# Patient Record
Sex: FEMALE | Race: WHITE | Employment: FULL TIME | URBAN - METROPOLITAN AREA
[De-identification: names, ages, dates, MRNs, and addresses within clinical notes are randomized per-mention and may not be internally consistent; named-entity substitution may affect disease eponyms.]

---

## 2018-04-23 ENCOUNTER — TELEPHONE (OUTPATIENT)
Dept: OTHER | Facility: CLINIC | Age: 43
End: 2018-04-23

## 2018-05-01 NOTE — PROGRESS NOTES
"   SUBJECTIVE:   CC: Nga Vega is an 42 year old woman who presents for preventive health visit.     Healthy Habits:    Do you get at least three servings of calcium containing foods daily (dairy, green leafy vegetables, etc.)? {YES/NO, DAIRY INTAKE:293653::\"yes\"}    Amount of exercise or daily activities, outside of work: {AMOUNT EXERCISE:652194}    Problems taking medications regularly {Yes /No default:581961::\"No\"}    Medication side effects: {Yes /No default.:413951::\"No\"}    Have you had an eye exam in the past two years? {YESNOBLANK:660142}    Do you see a dentist twice per year? {YESNOBLANK:755467}    Do you have sleep apnea, excessive snoring or daytime drowsiness?{YESNOBLANK:294052}  {Outside tests to abstract? :707853}    {additional problems to add (Optional):703813}    Today's PHQ-2 Score: No flowsheet data found.  {PHQ-2 LOOK IN ASSESSMENTS (Optional) :218353}  Abuse: Current or Past(Physical, Sexual or Emotional)- {YES/NO/NA:940799}  Do you feel safe in your environment - {YES/NO/NA:633266}    Social History   Substance Use Topics     Smoking status: Never Smoker     Smokeless tobacco: Not on file     Alcohol use Yes      Comment: 1-2/week     If you drink alcohol do you typically have >3 drinks per day or >7 drinks per week? {ETOH :354843}                     Reviewed orders with patient.  Reviewed health maintenance and updated orders accordingly - {Yes/No:692151::\"Yes\"}  {Chronicprobdata (Optional):223205}    {Mammo Decision Support (Optional):920994}    Pertinent mammograms are reviewed under the imaging tab.  History of abnormal Pap smear: {PAP HX:255653}    Reviewed and updated as needed this visit by clinical staff         Reviewed and updated as needed this visit by Provider        {HISTORY OPTIONS (Optional):696598}    ROS:  {FEMALE PREVENTATIVE ROS:346012}    OBJECTIVE:   There were no vitals taken for this visit.  EXAM:  {Exam Choices:681277}    ASSESSMENT/PLAN:   {Diag " "Picklist:150698}    COUNSELING:   {FEMALE COUNSELING MESSAGES:642002::\"Reviewed preventive health counseling, as reflected in patient instructions\"}    {BP Counseling- Complete if BP >= 120/80  (Optional):908291}     reports that she has never smoked. She does not have any smokeless tobacco history on file.  {Tobacco Cessation -- Complete if patient is a smoker (Optional):722752}  Estimated body mass index is 30.83 kg/(m^2) as calculated from the following:    Height as of 8/16/16: 5' 10\" (1.778 m).    Weight as of 8/16/16: 214 lb 14.4 oz (97.5 kg).   {Weight Management Plan (ACO) Complete if BMI is abnormal-  Ages 18-64  BMI >24.9.  Age 65+ with BMI <23 or >30 (Optional):957832}    Counseling Resources:  ATP IV Guidelines  Pooled Cohorts Equation Calculator  Breast Cancer Risk Calculator  FRAX Risk Assessment  ICSI Preventive Guidelines  Dietary Guidelines for Americans, 2010  USDA's MyPlate  ASA Prophylaxis  Lung CA Screening    Daxa Cochran MD  [facility]  "

## 2018-05-01 NOTE — PATIENT INSTRUCTIONS
Call your insurance to ask about mammogram coverage and if 3D mammogram is covered for screening.     Preventive Health Recommendations  Female Ages 40 to 49    Yearly exam:     See your health care provider every year in order to  1. Review health changes.   2. Discuss preventive care.    3. Review your medicines if your doctor prescribed any.      Get a Pap test every three years (unless you have an abnormal result and your provider advises testing more often).      If you get Pap tests with HPV test, you only need to test every 5 years, unless you have an abnormal result. You do not need a Pap test if your uterus was removed (hysterectomy) and you have not had cancer.      You should be tested each year for STDs (sexually transmitted diseases), if you're at risk.       Ask your doctor if you should have a mammogram.      Have a colonoscopy (test for colon cancer) if someone in your family has had colon cancer or polyps before age 50.       Have a cholesterol test every 5 years.       Have a diabetes test (fasting glucose) after age 45. If you are at risk for diabetes, you should have this test every 3 years.    Shots: Get a flu shot each year. Get a tetanus shot every 10 years.     Nutrition:     Eat at least 5 servings of fruits and vegetables each day.    Eat whole-grain bread, whole-wheat pasta and brown rice instead of white grains and rice.    Talk to your provider about Calcium and Vitamin D.     Lifestyle    Exercise at least 150 minutes a week (an average of 30 minutes a day, 5 days a week). This will help you control your weight and prevent disease.    Limit alcohol to one drink per day.    No smoking.     Wear sunscreen to prevent skin cancer.    See your dentist every six months for an exam and cleaning.

## 2018-05-07 ENCOUNTER — OFFICE VISIT (OUTPATIENT)
Dept: FAMILY MEDICINE | Facility: CLINIC | Age: 43
End: 2018-05-07
Payer: COMMERCIAL

## 2018-05-07 VITALS
RESPIRATION RATE: 16 BRPM | SYSTOLIC BLOOD PRESSURE: 126 MMHG | TEMPERATURE: 99 F | HEART RATE: 77 BPM | BODY MASS INDEX: 28.05 KG/M2 | DIASTOLIC BLOOD PRESSURE: 78 MMHG | WEIGHT: 189.4 LBS | HEIGHT: 69 IN

## 2018-05-07 DIAGNOSIS — A60.04 HERPES SIMPLEX VULVOVAGINITIS: ICD-10-CM

## 2018-05-07 DIAGNOSIS — Z00.00 ROUTINE GENERAL MEDICAL EXAMINATION AT A HEALTH CARE FACILITY: Primary | ICD-10-CM

## 2018-05-07 DIAGNOSIS — N91.2 AMENORRHEA: ICD-10-CM

## 2018-05-07 LAB — TSH SERPL DL<=0.005 MIU/L-ACNC: 1.58 MU/L (ref 0.4–4)

## 2018-05-07 PROCEDURE — 36415 COLL VENOUS BLD VENIPUNCTURE: CPT | Performed by: FAMILY MEDICINE

## 2018-05-07 PROCEDURE — G0145 SCR C/V CYTO,THINLAYER,RESCR: HCPCS | Performed by: FAMILY MEDICINE

## 2018-05-07 PROCEDURE — 84443 ASSAY THYROID STIM HORMONE: CPT | Performed by: FAMILY MEDICINE

## 2018-05-07 PROCEDURE — 87624 HPV HI-RISK TYP POOLED RSLT: CPT | Performed by: FAMILY MEDICINE

## 2018-05-07 PROCEDURE — 99386 PREV VISIT NEW AGE 40-64: CPT | Performed by: FAMILY MEDICINE

## 2018-05-07 RX ORDER — VALACYCLOVIR HYDROCHLORIDE 500 MG/1
500 TABLET, FILM COATED ORAL 2 TIMES DAILY
Qty: 30 TABLET | Refills: 3 | Status: SHIPPED | OUTPATIENT
Start: 2018-05-07 | End: 2019-10-14

## 2018-05-07 NOTE — LETTER
May 15, 2018    Nga Vega  [address]  [place] [address]    Dear Nga,  We are happy to inform you that your PAP smear result from 5/7/18 is normal.  We are now able to do a follow up test on PAP smears. The DNA test is for HPV (Human Papilloma Virus). Cervical cancer is closely linked with certain types of HPV. Your results showed no evidence of high risk HPV.  Therefore we recommend you return in 5 years for your next pap smear and HPV test.  You will still need to return to the clinic every year for an annual exam and other preventive tests.  Please contact the clinic at [phone] with any questions.  Sincerely,    Daxa Cochran MD/umer

## 2018-05-07 NOTE — MR AVS SNAPSHOT
After Visit Summary   5/7/2018    Nga Vega    MRN: [id number]           Patient Information     Date Of Birth          1975        Visit Information        Provider Department      5/7/2018 12:00 PM Daxa Cochran MD [facility]        Today's Diagnoses     Routine general medical examination at a health care facility    -  1    Herpes simplex vulvovaginitis        Amenorrhea          Care Instructions    Call your insurance to ask about mammogram coverage and if 3D mammogram is covered for screening.     Preventive Health Recommendations  Female Ages 40 to 49    Yearly exam:     See your health care provider every year in order to  1. Review health changes.   2. Discuss preventive care.    3. Review your medicines if your doctor prescribed any.      Get a Pap test every three years (unless you have an abnormal result and your provider advises testing more often).      If you get Pap tests with HPV test, you only need to test every 5 years, unless you have an abnormal result. You do not need a Pap test if your uterus was removed (hysterectomy) and you have not had cancer.      You should be tested each year for STDs (sexually transmitted diseases), if you're at risk.       Ask your doctor if you should have a mammogram.      Have a colonoscopy (test for colon cancer) if someone in your family has had colon cancer or polyps before age 50.       Have a cholesterol test every 5 years.       Have a diabetes test (fasting glucose) after age 45. If you are at risk for diabetes, you should have this test every 3 years.    Shots: Get a flu shot each year. Get a tetanus shot every 10 years.     Nutrition:     Eat at least 5 servings of fruits and vegetables each day.    Eat whole-grain bread, whole-wheat pasta and brown rice instead of white grains and rice.    Talk to your provider about Calcium and Vitamin D.     Lifestyle    Exercise at least 150 minutes a week (an average  "of 30 minutes a day, 5 days a week). This will help you control your weight and prevent disease.    Limit alcohol to one drink per day.    No smoking.     Wear sunscreen to prevent skin cancer.    See your dentist every six months for an exam and cleaning.          Follow-ups after your visit        Who to contact     If you have questions or need follow up information about today's clinic visit or your schedule please contact [facility] [place] directly at [phone].  Normal or non-critical lab and imaging results will be communicated to you by MyChart, letter or phone within 4 business days after the clinic has received the results. If you do not hear from us within 7 days, please contact the clinic through [employer] or phone. If you have a critical or abnormal lab result, we will notify you by phone as soon as possible.  Submit refill requests through "[employer] or call your pharmacy and they will forward the refill request to us. Please allow 3 business days for your refill to be completed.          Additional Information About Your Visit        "[employer] Information     "[employer] lets you send messages to your doctor, view your test results, renew your prescriptions, schedule appointments and more. To sign up, go to [email or web address] . Click on \"Log in\" on the left side of the screen, which will take you to the Welcome page. Then click on \"Sign up Now\" on the right side of the page.     You will be asked to enter the access code listed below, as well as some personal information. Please follow the directions to create your username and password.     Your access code is: [email or web address]  Expires: 2018  1:22 PM     Your access code will  in 90 days. If you need help or a new code, please call your [facility] or [phone].        Care EveryWhere ID     This is your Care EveryWhere ID. This could be used by other organizations to access your [place] medical records  [id number]        Your " "Vitals Were     Pulse Temperature Respirations Height Last Period BMI (Body Mass Index)    77 99  F (37.2  C) (Oral) 16 5' 9\" (1.753 m) 03/19/2018 27.97 kg/m2       Blood Pressure from Last 3 Encounters:   05/07/18 126/78   08/16/16 122/81    Weight from Last 3 Encounters:   05/07/18 189 lb 6.4 oz (85.9 kg)   08/16/16 214 lb 14.4 oz (97.5 kg)              Today, you had the following     No orders found for display       Primary Care Provider Office Phone # Fax #    Davy Fletcher [phone]       [place] [address] [address] [place] [facility] [address]        Equal Access to Services     CORNELL MACEDO : Hadii afshan cruzo Soomaali, waaxda luqadaha, qaybta kaalmada adeegyada, hermelinda burks . So [facility] [phone].    ATENCIÓN: Si habla español, tiene a baker disposición servicios gratuitos de asistencia lingüística. Llame al [phone].    We comply with applicable federal civil rights laws and [place] laws. We do not discriminate on the basis of race, color, national origin, age, disability, sex, sexual orientation, or gender identity.            Thank you!     Thank you for choosing [facility]  [facility]. Our goal is always to provide you with excellent care. Hearing back from our patients is one way we can continue to improve our services. Please take a few minutes to complete the written survey that you may receive in the mail after your visit with us. Thank you!             Your Updated Medication List - Protect others around you: Learn how to safely use, store and throw away your medicines at www.disposemymeds.org.          This list is accurate as of 5/7/18  1:22 PM.  Always use your most recent med list.                   Brand Name Dispense Instructions for use Diagnosis    ACYCLOVIR PO      Take 800 mg by mouth 2 times daily          "

## 2018-05-07 NOTE — LETTER
May 9, 2018    Nga Vega  [address] [address]            Dear Nga,    The results of your recent tests were normal.  Below is a copy of the results.  It was a pleasure to see you at your last appointment.    If you have any questions or concerns, please call myself or my nurse at [phone].    Sincerely,    Kristen Kehr, PA-C/laila    Results for orders placed or performed in visit on 05/07/18   TSH with free T4 reflex   Result Value Ref Range    TSH 1.58 0.40 - 4.00 mU/L

## 2018-05-07 NOTE — PROGRESS NOTES
SUBJECTIVE:   CC: Nga Vega is an 42 year old woman who presents for preventive health visit.     Physical   Annual:     Getting at least 3 servings of Calcium per day::  Yes    Bi-annual eye exam::  Yes    Dental care twice a year::  Yes    Sleep apnea or symptoms of sleep apnea::  Daytime drowsiness    Diet::  Regular (no restrictions)    Frequency of exercise::  2-3 days/week    Duration of exercise::  45-60 minutes    Taking medications regularly::  Yes    Medication side effects::  Not applicable    Additional concerns today::  YES            Irregular periods, has been tracking since June 2017. Ranging from 17-42 days in length. Before this time, periods were regular, occurring every 24-27 days and lasting 5 days. Patient with Esure x10 years since birth of youngest child. Patient endorsing bouts of night sweats and HA. Patient's mother with hx of early menopause.     Chronic Low Back Pain  -Initial XR of low back performed by chiropractor  -Patient manages pain with NSAIDs, exercise, ice, and heat packs  -Interested in re-imaging to evaluate for further degenerative changes    Hemorrhoids  -Since Nov/Dec 2017  -Well-managed with OTC rectal suppository; pruritis most bothersome sx  -Patient does not have problems with constipation    Genital Herpes  -x19 years; contracted from   -1-2 outbreaks annually; managed on acyclovir      Today's PHQ-2 Score:   PHQ-2 ( 1999 [employer]) 5/7/2018   Q1: Little interest or pleasure in doing things 0   Q2: Feeling down, depressed or hopeless 0   PHQ-2 Score 0   Q1: Little interest or pleasure in doing things Not at all   Q2: Feeling down, depressed or hopeless Not at all   PHQ-2 Score 0       Abuse: Current or Past(Physical, Sexual or Emotional)- No  Do you feel safe in your environment - Yes    Social History   Substance Use Topics     Smoking status: Never Smoker     Smokeless tobacco: Never Used     Alcohol use Yes      Comment: 1-2/week     Alcohol Use  5/7/2018   If you drink alcohol do you typically have greater than 3 drinks per day OR greater than 7 drinks per week? No   No flowsheet data found.    Reviewed orders with patient.  Reviewed health maintenance and updated orders accordingly - Yes  There is no problem list on file for this patient.    Past Surgical History:   Procedure Laterality Date     GYN SURGERY      4 vaginal deliveries     MAMMOPLASTY REDUCTION BILATERAL Bilateral 8/16/2016    Procedure: MAMMOPLASTY REDUCTION BILATERAL;  Surgeon: Andrew Craig MD;  Location: [facility]     ORTHOPEDIC SURGERY         Social History   Substance Use Topics     Smoking status: Never Smoker     Smokeless tobacco: Never Used     Alcohol use Yes      Comment: 1-2/week     Family History   Problem Relation Age of Onset     HEART DISEASE Mother      Hypertension Mother      DIABETES Father      Hypertension Father      Colon Cancer Maternal Grandmother          Current Outpatient Prescriptions   Medication Sig Dispense Refill     ACYCLOVIR PO Take 800 mg by mouth 2 times daily       valACYclovir (VALTREX) 500 MG tablet Take 1 tablet (500 mg) by mouth 2 times daily 30 tablet 3     Allergies   Allergen Reactions     Cats Itching       Mammogram not appropriate for this patient based on age.    Pertinent mammograms are reviewed under the imaging tab.  History of abnormal Pap smear: YES - colposcopy in 1995 that was negative, and normal PAP since this time    Reviewed and updated as needed this visit by clinical staff  Tobacco  Allergies  Meds  Problems  Med Hx  Surg Hx  Fam Hx  Soc Hx          Reviewed and updated as needed this visit by Provider  Allergies  Meds  Problems        Past Medical History:   Diagnosis Date     Allergic rhinitis      Chronic back pain      Genital herpes      Pap smear abnormality of vagina with ASC-US       Past Surgical History:   Procedure Laterality Date     GYN SURGERY      4 vaginal deliveries     MAMMOPLASTY REDUCTION  "BILATERAL Bilateral 2016    Procedure: MAMMOPLASTY REDUCTION BILATERAL;  Surgeon: Andrew Craig MD;  Location: [facility]     ORTHOPEDIC SURGERY       Obstetric History       T4      L4     SAB0   TAB0   Ectopic0   Multiple0   Live Births0       # Outcome Date GA Lbr Hussein/2nd Weight Sex Delivery Anes PTL Lv   4 Term            3 Term            2 Term            1 Term                   Review of Systems  CONSTITUTIONAL: NEGATIVE for fever, chills, change in weight  INTEGUMENTARU/SKIN: NEGATIVE for worrisome rashes, moles or lesions  EYES: NEGATIVE for vision changes or irritation  ENT: NEGATIVE for ear, mouth and throat problems  RESP: NEGATIVE for significant cough or SOB  BREAST: NEGATIVE for masses, tenderness or discharge  CV: NEGATIVE for chest pain, palpitations or peripheral edema  GI: NEGATIVE for nausea, abdominal pain, heartburn, or change in bowel habits  : NEGATIVE for unusual urinary or vaginal symptoms.   MUSCULOSKELETAL: POSITIVE for chronic low back myalgia  NEURO: NEGATIVE for weakness, dizziness or paresthesias  PSYCHIATRIC: NEGATIVE for changes in mood or affect     OBJECTIVE:   /78  Pulse 77  Temp 99  F (37.2  C) (Oral)  Resp 16  Ht 5' 9\" (1.753 m)  Wt 189 lb 6.4 oz (85.9 kg)  LMP 2018  BMI 27.97 kg/m2  Physical Exam  GENERAL: healthy, alert and no distress  EYES: Eyes grossly normal to inspection, PERRL and conjunctivae and sclerae normal  HENT: ear canals and TM's normal, nose and mouth without ulcers or lesions  NECK: no adenopathy, no asymmetry, masses, or scars and thyroid normal to palpation  RESP: lungs clear to auscultation - no rales, rhonchi or wheezes  BREAST: normal without masses, tenderness or nipple discharge and no palpable axillary masses or adenopathy. Bilateral keloid-type scarring bilateral inferior breasts, s/p bilateral breast reduction  CV: regular rate and rhythm, normal S1 S2, no S3 or S4, no murmur, click or rub, no peripheral " "edema and peripheral pulses strong  ABDOMEN: soft, nontender, no hepatosplenomegaly, no masses and bowel sounds normal   (female): normal female external genitalia, normal urethral meatus, vaginal mucosa pink, moist, well rugated, and normal cervix/adnexa/uterus without masses or discharge  MS: no gross musculoskeletal defects noted, no edema  SKIN: no suspicious lesions or rashes  NEURO: Normal strength and tone, mentation intact and speech normal  PSYCH: mentation appears normal, affect normal/bright    ASSESSMENT/PLAN:     (Z00.00) Routine general medical examination at a health care facility  (primary encounter diagnosis)  Comment: Discussed preventive healthcare recommendations  Plan: Pap imaged thin layer screen with HPV -         recommended age 30 - 65, HPV High Risk Types         DNA Cervical  Ok to go to q5 yr pap if nil/neg     (A60.04) Herpes simplex vulvovaginitis  Comment: Stable   Plan: valACYclovir (VALTREX) 500 MG tablet    (N91.2) Amenorrhea  Comment: Irregular menstrual patterns since June 2017 with vasomotor sx  Plan: TSH with free T4 reflex  Possible early menopause, + fam hx in mom       COUNSELING:  Reviewed preventive health counseling, as reflected in patient instructions       (Gretchen)menopause management       Mammogram recommendations     reports that she has never smoked. She has never used smokeless tobacco.    Estimated body mass index is 27.97 kg/(m^2) as calculated from the following:    Height as of this encounter: 5' 9\" (1.753 m).    Weight as of this encounter: 189 lb 6.4 oz (85.9 kg).       Counseling Resources:  ATP IV Guidelines  Pooled Cohorts Equation Calculator  Breast Cancer Risk Calculator  FRAX Risk Assessment  ICSI Preventive Guidelines  Dietary Guidelines for Americans, 2010  USDA's MyPlate  ASA Prophylaxis  Lung CA Screening    Daxa Cochran MD  [facility] [place]  Answers for HPI/ROS submitted by the patient on 5/7/2018   PHQ-2 Score: 0    "

## 2018-05-07 NOTE — NURSING NOTE
"Chief Complaint   Patient presents with     Physical     Health Maintenance     orders pended, tdap       Initial /78  Pulse 77  Temp 99  F (37.2  C) (Oral)  Resp 16  Ht 5' 9\" (1.753 m)  Wt 189 lb 6.4 oz (85.9 kg)  LMP 03/19/2018  BMI 27.97 kg/m2 Estimated body mass index is 27.97 kg/(m^2) as calculated from the following:    Height as of this encounter: 5' 9\" (1.753 m).    Weight as of this encounter: 189 lb 6.4 oz (85.9 kg).  Medication Reconciliation: complete  Merrill Finnegan CMA    "

## 2018-05-10 LAB
COPATH REPORT: NORMAL
PAP: NORMAL

## 2018-05-14 LAB
FINAL DIAGNOSIS: NORMAL
HPV HR 12 DNA CVX QL NAA+PROBE: NEGATIVE
HPV16 DNA SPEC QL NAA+PROBE: NEGATIVE
HPV18 DNA SPEC QL NAA+PROBE: NEGATIVE
SPECIMEN DESCRIPTION: NORMAL
SPECIMEN SOURCE CVX/VAG CYTO: NORMAL

## 2018-05-24 ENCOUNTER — OFFICE VISIT (OUTPATIENT)
Dept: FAMILY MEDICINE | Facility: CLINIC | Age: 43
End: 2018-05-24
Payer: COMMERCIAL

## 2018-05-24 VITALS
WEIGHT: 189 LBS | BODY MASS INDEX: 27.91 KG/M2 | DIASTOLIC BLOOD PRESSURE: 77 MMHG | SYSTOLIC BLOOD PRESSURE: 120 MMHG | TEMPERATURE: 98.8 F | HEART RATE: 71 BPM | OXYGEN SATURATION: 99 % | RESPIRATION RATE: 16 BRPM

## 2018-05-24 DIAGNOSIS — J01.00 ACUTE MAXILLARY SINUSITIS, RECURRENCE NOT SPECIFIED: Primary | ICD-10-CM

## 2018-05-24 DIAGNOSIS — R09.81 NASAL CONGESTION: ICD-10-CM

## 2018-05-24 PROCEDURE — 99213 OFFICE O/P EST LOW 20 MIN: CPT | Performed by: INTERNAL MEDICINE

## 2018-05-24 RX ORDER — FLUTICASONE PROPIONATE 50 MCG
2 SPRAY, SUSPENSION (ML) NASAL DAILY
Qty: 1 BOTTLE | Refills: 0 | Status: SHIPPED | OUTPATIENT
Start: 2018-05-24

## 2018-05-24 ASSESSMENT — PAIN SCALES - GENERAL: PAINLEVEL: MODERATE PAIN (4)

## 2018-05-24 NOTE — PROGRESS NOTES
SUBJECTIVE:  Nga Vega is an 42 year old female who presents for not feeling well.  Feels like has a sinus infection.  About 11 days ago started getting some nasal congestion. Has worsened and has sinus pressure, more on left than right side.  No fevers, chills, sweats.  Has a lot of congestion and nasal drainage.  Teeth on left upper mouth started to hurt yesterday.  Cough from the drainage going down throat.  Left ear hurts some, feels plugged.  No recent travel.  No known exposures.  Took some dayquil and nyquil which helped at first, but not help much now.         has a past medical history of Allergic rhinitis; Chronic back pain; Genital herpes; and Pap smear abnormality of vagina with ASC-US.  Social History     Social History     Marital status:      Spouse name: N/A     Number of children: N/A     Years of education: N/A     Social History Main Topics     Smoking status: Never Smoker     Smokeless tobacco: Never Used     Alcohol use Yes      Comment: 1-2/week     Drug use: No     Sexual activity: Not Asked     Other Topics Concern     None     Social History Narrative     Family History   Problem Relation Age of Onset     HEART DISEASE Mother      Hypertension Mother      DIABETES Father      Hypertension Father      Colon Cancer Maternal Grandmother        ALLERGIES:  Cats    Current Outpatient Prescriptions   Medication     ACYCLOVIR PO     valACYclovir (VALTREX) 500 MG tablet     No current facility-administered medications for this visit.          ROS:  ROS is done and is negative for general, constitutional, eye, ENT, Respiratory, cardiovascular, GI, , Skin, musculoskeletal except as noted elsewhere.      OBJECTIVE:  /77  Pulse 71  Temp 98.8  F (37.1  C) (Oral)  Resp 16  Wt 189 lb (85.7 kg)  SpO2 99%  Breastfeeding? No  BMI 27.91 kg/m2  GENERAL APPEARANCE: Alert, in no acute distress  EYES: normal  EARS: External ears normal. Canals clear. TMs with mild clear effusions, no  erythema.  NOSE:mild yellow discharge and mildly inflamed mucosa  OROPHARYNX:normal  SINUSES: tender over left maxillary sinus.  NECK:No adenopathy,masses or thyromegaly  RESP: normal and clear to auscultation  CV:regular rate and rhythm and no murmurs, clicks, or gallops  ABDOMEN: Abdomen soft, non-tender. BS normal. No masses, organomegaly  SKIN: no ulcers, lesions or rash  MUSCULOSKELETAL:Musculoskeletal normal      RESULTS  .  No results found for this or any previous visit (from the past 48 hour(s)).    ASSESSMENT/PLAN:    ASSESSMENT / PLAN:  (J01.00) Acute maxillary sinusitis, recurrence not specified  (primary encounter diagnosis)  Comment:   Plan: amoxicillin-clavulanate (AUGMENTIN) 875-125 MG         per tablet        Reviewed medication instructions and side effects. Follow up if experiences side effects. I reviewed supportive care, expected course, and signs of concern.  Follow up as needed or if she does not improve within 1 week(s) or if worsens in any way.  Reviewed red flag symptoms and is to go to the ER if experiences any of these.    (R09.81) Nasal congestion  Comment:   Plan: fluticasone (FLONASE) 50 MCG/ACT spray        Reviewed medication instructions and side effects. Follow up if experiences side effects.. I reviewed supportive care, expected course, and signs of concern.  Follow up as needed or if she does not improve within 2 week(s) or if worsens in any way.  Reviewed red flag symptoms and is to go to the ER if experiences any of these.      See [facility] for orders, medications, letters, patient instructions    Gerda Estrada M.D.

## 2018-05-24 NOTE — MR AVS SNAPSHOT
"              After Visit Summary   5/24/2018    Nga Vega    MRN: [id number]           Patient Information     Date Of Birth          1975        Visit Information        Provider Department      5/24/2018 3:20 PM Gerda Estrada MD [facility]        Today's Diagnoses     Acute maxillary sinusitis, recurrence not specified    -  1    Nasal congestion           Follow-ups after your visit        Follow-up notes from your care team     Return in about 1 week (around 5/31/2018), or if symptoms worsen or fail to improve, for follow up with primary doctor.      Who to contact     If you have questions or need follow up information about today's clinic visit or your schedule please contact [facility] directly at [phone].  Normal or non-critical lab and imaging results will be communicated to you by MyChart, letter or phone within 4 business days after the clinic has received the results. If you do not hear from us within 7 days, please contact the clinic through "[employer]hart or phone. If you have a critical or abnormal lab result, we will notify you by phone as soon as possible.  Submit refill requests through [employer] or call your pharmacy and they will forward the refill request to us. Please allow 3 business days for your refill to be completed.          Additional Information About Your Visit        "[employer] Information     [employer] lets you send messages to your doctor, view your test results, renew your prescriptions, schedule appointments and more. To sign up, go to [email or web address] . Click on \"Log in\" on the left side of the screen, which will take you to the Welcome page. Then click on \"Sign up Now\" on the right side of the page.     You will be asked to enter the access code listed below, as well as some personal information. Please follow the directions to create your username and password.     Your access code is: [email or web address]  Expires: 8/5/2018  1:22 PM   "   Your access code will  in 90 days. If you need help or a new code, please call your [place] clinic or [phone].        Care EveryWhere ID     This is your Care EveryWhere ID. This could be used by other organizations to access your [place] medical records  [id number]        Your Vitals Were     Pulse Temperature Respirations Pulse Oximetry Breastfeeding? BMI (Body Mass Index)    71 98.8  F (37.1  C) (Oral) 16 99% No 27.91 kg/m2       Blood Pressure from Last 3 Encounters:   18 120/77   18 126/78   16 122/81    Weight from Last 3 Encounters:   18 189 lb (85.7 kg)   18 189 lb 6.4 oz (85.9 kg)   16 214 lb 14.4 oz (97.5 kg)              Today, you had the following     No orders found for display         Today's Medication Changes          These changes are accurate as of 18  4:35 PM.  If you have any questions, ask your nurse or doctor.               Start taking these medicines.        Dose/Directions    amoxicillin-clavulanate 875-125 MG per tablet   Commonly known as:  AUGMENTIN   Used for:  Acute maxillary sinusitis, recurrence not specified   Started by:  Gerda Estrada MD        Dose:  1 tablet   Take 1 tablet by mouth 2 times daily   Quantity:  20 tablet   Refills:  0       fluticasone 50 MCG/ACT spray   Commonly known as:  FLONASE   Used for:  Nasal congestion   Started by:  Gerda Estrada MD        Dose:  2 spray   Spray 2 sprays into both nostrils daily   Quantity:  1 Bottle   Refills:  0            Where to get your medicines      These medications were sent to [facility] [address], [address]  [address] [address] [facility] [address] [address]     Phone:  [phone]     amoxicillin-clavulanate 875-125 MG per tablet    fluticasone 50 MCG/ACT spray                Primary Care Provider Office Phone # Fax #    Davy Fletcher [phone]       [facility] [facility] [address] [facility] [address] [facility]  [place] [address]        Equal Access to Services     [facility] Hospital[facility] : Hadii aad ku hadanijimbo [place], wanitzada jaylynglendaha, qaedwardna duheatherhemrelinda wiley. So [facility] [phone].    ATENCIÓN: Si habla español, tiene a baker disposición servicios gratuitos de asistencia lingüística. Llame al [phone].    We comply with applicable federal civil rights laws and [place] laws. We do not discriminate on the basis of race, color, national origin, age, disability, sex, sexual orientation, or gender identity.            Thank you!     Thank you for choosing [facility] [place]  [facility]. Our goal is always to provide you with excellent care. Hearing back from our patients is one way we can continue to improve our services. Please take a few minutes to complete the written survey that you may receive in the mail after your visit with us. Thank you!             Your Updated Medication List - Protect others around you: Learn how to safely use, store and throw away your medicines at www.disposemymeds.org.          This list is accurate as of 5/24/18  4:35 PM.  Always use your most recent med list.                   Brand Name Dispense Instructions for use Diagnosis    ACYCLOVIR PO      Take 800 mg by mouth 2 times daily        amoxicillin-clavulanate 875-125 MG per tablet    AUGMENTIN    20 tablet    Take 1 tablet by mouth 2 times daily    Acute maxillary sinusitis, recurrence not specified       fluticasone 50 MCG/ACT spray    FLONASE    1 Bottle    Spray 2 sprays into both nostrils daily    Nasal congestion       valACYclovir 500 MG tablet    VALTREX    30 tablet    Take 1 tablet (500 mg) by mouth 2 times daily    Herpes simplex vulvovaginitis

## 2018-05-24 NOTE — NURSING NOTE
"Chief Complaint   Patient presents with     Sinus Problem     c/o sinus pressure, drainage and left ear pain        Initial /77  Pulse 71  Temp 98.8  F (37.1  C) (Oral)  Resp 16  Wt 189 lb (85.7 kg)  SpO2 99%  Breastfeeding? No  BMI 27.91 kg/m2 Estimated body mass index is 27.91 kg/(m^2) as calculated from the following:    Height as of 5/7/18: 5' 9\" (1.753 m).    Weight as of this encounter: 189 lb (85.7 kg).  Medication Reconciliation: complete  Patient and/or MA were masked during rooming process  Catalina Pollard MA        "

## 2019-07-29 NOTE — PROGRESS NOTES
Nga is a 43 year old  female who presents for annual exam.     Besides routine health maintenance, she would like to discuss menopausal sx's.    HPI:   Pt has been skipping periods for 7 months this yr but then had a long one a couple weeks ago.  Has night sweats, less in the last couple weeks.   Doesn't want ocp or hrt she thinks. Has more headaches.     Her mom had early menopause.   Pt had normal thyroid test last yr but hasn't had fsh checked yet.     Would like to restart celexa due to stress with ongoing marital counseling.  She was on 40 mg in the past.    Pt has 4 kids, uses essure for contraception  Prior breast reduction, has some chronic itching bilateral since surgery.   No hx of abnormal pap smears               GYNECOLOGIC HISTORY:    Patient's last menstrual period was 07/10/2019.  Her current contraception method is: Essure.  She  reports that she has never smoked. She has never used smokeless tobacco.    Patient is sexually active.  STD testing offered?  Declined  Last PHQ-9 score on record =   PHQ-9 SCORE 2019   PHQ-9 Total Score 10     Last GAD7 score on record =   ACOSTA-7 SCORE 2019   Total Score 3     Alcohol Score = 2    HEALTH MAINTENANCE:  Cholesterol: TSH= 18 1.58    Last Mammo: Never, Result: not applicable, Next Mammo: needs to schedule   Pap:   Lab Results   Component Value Date    PAP NIL 2018 WNL HPV (-)neg  Colonoscopy:  NA, Result: not applicable, Next Colonoscopy: NA years.  Dexa:  NA    Health maintenance updated:  yes    HISTORY:  OB History    Para Term  AB Living   4 4 4 0 0 4   SAB TAB Ectopic Multiple Live Births   0 0 0 0 4      # Outcome Date GA Lbr Hussein/2nd Weight Sex Delivery Anes PTL Lv   4 Term         ROBYN   3 Term         ROBYN   2 Term         ROBYN   1 Term         ROBYN       Patient Active Problem List   Diagnosis     Cervical cancer screening     Past Surgical History:   Procedure Laterality Date     AS  "HYSTEROS W PERMANENT FALLOPAIN IMPLANT       GYN SURGERY      4 vaginal deliveries     MAMMOPLASTY REDUCTION BILATERAL Bilateral 8/16/2016    Procedure: MAMMOPLASTY REDUCTION BILATERAL;  Surgeon: Andrew Craig MD;  Location: [facility]     ORTHOPEDIC SURGERY        Social History     Tobacco Use     Smoking status: Never Smoker     Smokeless tobacco: Never Used   Substance Use Topics     Alcohol use: Yes     Comment: 1-2/week      Problem (# of Occurrences) Relation (Name,Age of Onset)    Colon Cancer (1) Maternal Grandmother    Diabetes (1) Father    Heart Disease (1) Mother    Hypertension (2) Mother, Father            Current Outpatient Medications   Medication Sig     ACYCLOVIR PO Take 800 mg by mouth 2 times daily     citalopram (CELEXA) 40 MG tablet Take 1 tablet (40 mg) by mouth daily     fluticasone (FLONASE) 50 MCG/ACT spray Spray 2 sprays into both nostrils daily     valACYclovir (VALTREX) 500 MG tablet Take 1 tablet (500 mg) by mouth 2 times daily     No current facility-administered medications for this visit.      Allergies   Allergen Reactions     Cats Itching       Past medical, surgical, social and family histories were reviewed and updated in EPIC.    ROS:   12 point review of systems negative other than symptoms noted below.  Constitutional: Fatigue and Weight Gain  Genitourinary: Hot Flashes, Irregular Menses, Night Sweats and Vaginal Discharge  Neurologic: Headaches  Psychiatric: Anxiety, Depression, Difficulty Sleeping and [place]    EXAM:  /62   Ht 1.778 m (5' 10\")   Wt 93 kg (205 lb)   LMP 07/10/2019   BMI 29.41 kg/m     BMI: Body mass index is 29.41 kg/m .    PHYSICAL EXAM:  Constitutional:  Appearance: Well nourished, well developed, alert, in no acute distress  Neck:  Lymph Nodes:  No lymphadenopathy present    Thyroid:  Gland size normal, nontender, no nodules or masses present  on palpation  Chest:  Respiratory Effort:  Breathing unlabored  Cardiovascular:    Heart: Auscultation: "  Regular rate, normal rhythm, no murmurs present  Breasts: Inspection of Breasts:  No lymphadenopathy present., Palpation of Breasts and Axillae:  No masses present on palpation, no breast tenderness., Axillary Lymph Nodes:  No lymphadenopathy present. and No nodularity, asymmetry or nipple discharge bilaterally.  Gastrointestinal:   Abdominal Examination:  Abdomen nontender to palpation, tone normal without rigidity or guarding, no masses present, umbilicus without lesions   Liver and Spleen:  No hepatomegaly present, liver nontender to palpation    Hernias:  No hernias present  Lymphatic: Lymph Nodes:  No other lymphadenopathy present  Skin:  General Inspection:  No rashes present, no lesions present, no areas of  discoloration    Genitalia and Groin:  No rashes present, no lesions present, no areas of  discoloration, no masses present  Neurologic/Psychiatric:    Mental Status:  Oriented X3     Pelvic Exam:  External Genitalia:     Normal appearance for age, no discharge present, no tenderness present, no inflammatory lesions present, color normal  Vagina:     Normal vaginal vault without central or paravaginal defects, no discharge present, no inflammatory lesions present, no masses present  Bladder:     Nontender to palpation  Urethra:   Urethral Body:  Urethra palpation normal, urethra structural support normal   Urethral Meatus:  No erythema or lesions present  Cervix:     Appearance healthy, no lesions present, nontender to palpation, no bleeding present  Uterus:     Uterus: firm, normal sized and nontender, midplane in position.   Adnexa:     No adnexal tenderness present, no adnexal masses present  Perineum:     Perineum within normal limits, no evidence of trauma, no rashes or skin lesions present  Anus:     Anus within normal limits, no hemorrhoids present  Inguinal Lymph Nodes:     No lymphadenopathy present  Pubic Hair:     Normal pubic hair distribution for age  Genitalia and Groin:     No rashes  present, no lesions present, no areas of discoloration, no masses present      COUNSELING:   Reviewed preventive health counseling, as reflected in patient instructions       (Gretchen)menopause management    BMI: Body mass index is 29.41 kg/m .  Weight management plan: Discussed healthy diet and exercise guidelines    ASSESSMENT:  43 year old female with satisfactory annual exam.    ICD-10-CM    1. Encounter for gynecological examination without abnormal finding Z01.419    2. Menopausal symptoms N95.1 Follicle stimulating hormone   3. Other depression F32.89 citalopram (CELEXA) 40 MG tablet       PLAN:  rx sent for celexa 40 mg daily for her to resume    Disc options of hrt or ocp for menopause sx control. She declines for now.   Disc typical patterns in menopause, expected lab results. Disc that FSH can go up and down for awhile so that fits with estrogen levels fluctuating.     Disc that most likely she is going to have menopause earlier than some people. We will check FSH today    Needs to schedule mammogram, hasn't had one yet    Disc taking Vit D 2000 international units daily now    Additional 20 min spent today on counceling and discussion of problems beyond preventive services.  More then 50% of visit.     Nikole Wen MD

## 2019-07-30 ENCOUNTER — OFFICE VISIT (OUTPATIENT)
Dept: OBGYN | Facility: CLINIC | Age: 44
End: 2019-07-30
Payer: COMMERCIAL

## 2019-07-30 VITALS
BODY MASS INDEX: 29.35 KG/M2 | DIASTOLIC BLOOD PRESSURE: 62 MMHG | WEIGHT: 205 LBS | SYSTOLIC BLOOD PRESSURE: 120 MMHG | HEIGHT: 70 IN

## 2019-07-30 DIAGNOSIS — N95.1 MENOPAUSAL SYMPTOMS: ICD-10-CM

## 2019-07-30 DIAGNOSIS — Z01.419 ENCOUNTER FOR GYNECOLOGICAL EXAMINATION WITHOUT ABNORMAL FINDING: Primary | ICD-10-CM

## 2019-07-30 DIAGNOSIS — F32.89 OTHER DEPRESSION: ICD-10-CM

## 2019-07-30 LAB — FSH SERPL-ACNC: 5.1 IU/L

## 2019-07-30 PROCEDURE — 99386 PREV VISIT NEW AGE 40-64: CPT | Performed by: OBSTETRICS & GYNECOLOGY

## 2019-07-30 PROCEDURE — 36415 COLL VENOUS BLD VENIPUNCTURE: CPT | Performed by: OBSTETRICS & GYNECOLOGY

## 2019-07-30 PROCEDURE — 83001 ASSAY OF GONADOTROPIN (FSH): CPT | Performed by: OBSTETRICS & GYNECOLOGY

## 2019-07-30 PROCEDURE — 99213 OFFICE O/P EST LOW 20 MIN: CPT | Mod: 25 | Performed by: OBSTETRICS & GYNECOLOGY

## 2019-07-30 RX ORDER — CITALOPRAM HYDROBROMIDE 40 MG/1
40 TABLET ORAL DAILY
Qty: 90 TABLET | Refills: 3 | Status: SHIPPED | OUTPATIENT
Start: 2019-07-30 | End: 2020-07-15

## 2019-07-30 ASSESSMENT — ANXIETY QUESTIONNAIRES
1. FEELING NERVOUS, ANXIOUS, OR ON EDGE: SEVERAL DAYS
2. NOT BEING ABLE TO STOP OR CONTROL WORRYING: NOT AT ALL
3. WORRYING TOO MUCH ABOUT DIFFERENT THINGS: NOT AT ALL
7. FEELING AFRAID AS IF SOMETHING AWFUL MIGHT HAPPEN: NOT AT ALL
GAD7 TOTAL SCORE: 3
IF YOU CHECKED OFF ANY PROBLEMS ON THIS QUESTIONNAIRE, HOW DIFFICULT HAVE THESE PROBLEMS MADE IT FOR YOU TO DO YOUR WORK, TAKE CARE OF THINGS AT HOME, OR GET ALONG WITH OTHER PEOPLE: NOT DIFFICULT AT ALL
6. BECOMING EASILY ANNOYED OR IRRITABLE: SEVERAL DAYS
5. BEING SO RESTLESS THAT IT IS HARD TO SIT STILL: NOT AT ALL

## 2019-07-30 ASSESSMENT — MIFFLIN-ST. JEOR: SCORE: 1670.12

## 2019-07-30 ASSESSMENT — PATIENT HEALTH QUESTIONNAIRE - PHQ9
SUM OF ALL RESPONSES TO PHQ QUESTIONS 1-9: 10
5. POOR APPETITE OR OVEREATING: SEVERAL DAYS

## 2019-07-31 ASSESSMENT — ANXIETY QUESTIONNAIRES: GAD7 TOTAL SCORE: 3

## 2019-10-14 DIAGNOSIS — A60.04 HERPES SIMPLEX VULVOVAGINITIS: ICD-10-CM

## 2019-10-14 NOTE — LETTER
October 16, 2019    Nga Vega  [address] [facility] [address] [address]    Dear Nga,       We recently received a refill request for valACYclovir (VALTREX) 500 MG tablet.  We have refilled this for a one time 30 day supply only because you are due for a:    Medication check office visit-it has been more that 1 year since you have had an appointment.      Please call at your earliest convenience so that there will not be a delay with your future refills.          Thank you,   Your [facility] Team/  [phone]

## 2019-10-16 RX ORDER — VALACYCLOVIR HYDROCHLORIDE 500 MG/1
TABLET, FILM COATED ORAL
Qty: 30 TABLET | Refills: 0 | Status: SHIPPED | OUTPATIENT
Start: 2019-10-16 | End: 2020-07-15

## 2019-10-16 NOTE — TELEPHONE ENCOUNTER
Medication is being filled for 1 time refill only due to:  Patient needs to be seen because it has been more than one year since last visit. needs cr lab.  Delia STEPHENSONN, RN

## 2019-11-06 ENCOUNTER — HEALTH MAINTENANCE LETTER (OUTPATIENT)
Age: 44
End: 2019-11-06

## 2020-06-24 ENCOUNTER — TELEPHONE (OUTPATIENT)
Dept: OBGYN | Facility: CLINIC | Age: 45
End: 2020-06-24

## 2020-06-24 DIAGNOSIS — N63.0 BREAST LUMP: Primary | ICD-10-CM

## 2020-06-24 NOTE — TELEPHONE ENCOUNTER
OV with Dr Wen 7/30/19-no mention of breast lump    Pt arrived for screening mammogram at the breast center today   Reports a breast lump she has had for years  Radiologist wants diagnostic testing done.  Pt at the breast center now.    [id number] orders placed per verbal order from Dr Wen.    Clarissa Jason, RN on 6/24/2020 at 2:06 PM

## 2020-07-13 ENCOUNTER — ANCILLARY PROCEDURE (OUTPATIENT)
Dept: ULTRASOUND IMAGING | Facility: CLINIC | Age: 45
End: 2020-07-13
Attending: OBSTETRICS & GYNECOLOGY
Payer: COMMERCIAL

## 2020-07-13 ENCOUNTER — ANCILLARY PROCEDURE (OUTPATIENT)
Dept: MAMMOGRAPHY | Facility: CLINIC | Age: 45
End: 2020-07-13
Attending: OBSTETRICS & GYNECOLOGY
Payer: COMMERCIAL

## 2020-07-13 DIAGNOSIS — N63.0 BREAST LUMP: ICD-10-CM

## 2020-07-13 PROCEDURE — G0279 TOMOSYNTHESIS, MAMMO: HCPCS | Performed by: STUDENT IN AN ORGANIZED HEALTH CARE EDUCATION/TRAINING PROGRAM

## 2020-07-13 PROCEDURE — 76882 US LMTD JT/FCL EVL NVASC XTR: CPT | Mod: RT | Performed by: STUDENT IN AN ORGANIZED HEALTH CARE EDUCATION/TRAINING PROGRAM

## 2020-07-13 PROCEDURE — 77066 DX MAMMO INCL CAD BI: CPT | Performed by: STUDENT IN AN ORGANIZED HEALTH CARE EDUCATION/TRAINING PROGRAM

## 2020-07-14 NOTE — PROGRESS NOTES
Nga is a 44 year old  female who presents for annual exam.     Besides routine health maintenance, she has no other health concerns today .    HPI:  The patient's PCP is  Davy Fletcher MD.   Patient does life insurance exams  Is using good covid precautions  Refill celexa and valtrex  No concerns         GYNECOLOGIC HISTORY:    Patient's last menstrual period was 2020.    Regular menses? no  Menses every 60 days.  Length of menses: 5 days    Her current contraception method is: none.  She  reports that she has never smoked. She has never used smokeless tobacco.    Patient is sexually active.  STD testing offered?  Declined  Last PHQ-9 score on record =   PHQ-9 SCORE 7/15/2020   PHQ-9 Total Score 4     Last GAD7 score on record =   ACOSTA-7 SCORE 7/15/2020   Total Score 0     Alcohol Score = 3    HEALTH MAINTENANCE:  Cholesterol: (No results found for: CHOL   Last Mammo: 2020, Result: Normal , Next Mammo:    Pap:   Lab Results   Component Value Date    PAP NIL HPV- 2018      Colonoscopy:  NEVER, Result: Not applicable, Next Colonoscopy: Due at age 51 years.  Dexa:  never    Health maintenance updated:  yes    HISTORY:  OB History    Para Term  AB Living   4 4 4 0 0 4   SAB TAB Ectopic Multiple Live Births   0 0 0 0 4      # Outcome Date GA Lbr Hussein/2nd Weight Sex Delivery Anes PTL Lv   4 Term         ROBYN   3 Term         ROBYN   2 Term         ROBYN   1 Term         ROBYN       Patient Active Problem List   Diagnosis     Cervical cancer screening     Past Surgical History:   Procedure Laterality Date     AS HYSTEROS W PERMANENT FALLOPAIN IMPLANT       GYN SURGERY      4 vaginal deliveries     MAMMOPLASTY REDUCTION BILATERAL Bilateral 2016    Procedure: MAMMOPLASTY REDUCTION BILATERAL;  Surgeon: Andrew Craig MD;  Location: [facility]     ORTHOPEDIC SURGERY        Social History     Tobacco Use     Smoking status: Never Smoker     Smokeless tobacco: Never  "Used   Substance Use Topics     Alcohol use: Yes     Frequency: 2-3 times a week     Drinks per session: 1 or 2     Binge frequency: Never     Comment: 1-2/week      Problem (# of Occurrences) Relation (Name,Age of Onset)    Colon Cancer (1) Maternal Grandmother    Diabetes (1) Father    Heart Disease (1) Mother    Hypertension (2) Mother, Father            Current Outpatient Medications   Medication Sig     citalopram (CELEXA) 40 MG tablet Take 1 tablet (40 mg) by mouth daily     valACYclovir (VALTREX) 500 MG tablet TAKE 1 TABLET(500 MG) BY MOUTH TWICE DAILY     ACYCLOVIR PO Take 800 mg by mouth 2 times daily     fluticasone (FLONASE) 50 MCG/ACT spray Spray 2 sprays into both nostrils daily (Patient not taking: Reported on 7/15/2020)     No current facility-administered medications for this visit.      Allergies   Allergen Reactions     Cats Itching       Past medical, surgical, social and family histories were reviewed and updated in EPIC.    ROS:   12 point review of systems negative other than symptoms noted below or in the HPI.  No urinary frequency or dysuria, bladder or kidney problems    EXAM:  /74   Pulse 68   Ht 1.778 m (5' 10\")   Wt 99.3 kg (219 lb)   LMP 07/06/2020   BMI 31.42 kg/m     BMI: Body mass index is 31.42 kg/m .    PHYSICAL EXAM:  Constitutional:   Appearance: Well nourished, well developed, alert, in no acute distress  Neck:  Lymph Nodes:  No lymphadenopathy present    Thyroid:  Gland size normal, nontender, no nodules or masses present  on palpation  Chest:  Respiratory Effort:  Breathing unlabored  Cardiovascular:    Heart: Auscultation:  Regular rate, normal rhythm, no murmurs present  Breasts: Inspection of Breasts:  No lymphadenopathy present., Palpation of Breasts and Axillae:  No masses present on palpation, no breast tenderness., Axillary Lymph Nodes:  No lymphadenopathy present. and No nodularity, asymmetry or nipple discharge bilaterally.  Gastrointestinal:   Abdominal " Examination:  Abdomen nontender to palpation, tone normal without rigidity or guarding, no masses present, umbilicus without lesions   Liver and Spleen:  No hepatomegaly present, liver nontender to palpation    Hernias:  No hernias present  Lymphatic: Lymph Nodes:  No other lymphadenopathy present  Skin:  General Inspection:  No rashes present, no lesions present, no areas of  discoloration  Neurologic:    Mental Status:  Oriented X3.  Normal strength and tone, sensory exam                grossly normal, mentation intact and speech normal.    Psychiatric:   Mentation appears normal and affect normal/bright.         Pelvic Exam:  External Genitalia:     Normal appearance for age, no discharge present, no tenderness present, no inflammatory lesions present, color normal  Vagina:     Normal vaginal vault without central or paravaginal defects, no discharge present, no inflammatory lesions present, no masses present  Bladder:     Nontender to palpation  Urethra:   Urethral Body:  Urethra palpation normal, urethra structural support normal   Urethral Meatus:  No erythema or lesions present  Cervix:     Appearance healthy, no lesions present, nontender to palpation, no bleeding present  Uterus:     Uterus: firm, normal sized and nontender, midplane in position.   Adnexa:     No adnexal tenderness present, no adnexal masses present  Perineum:     Perineum within normal limits, no evidence of trauma, no rashes or skin lesions present  Anus:     Anus within normal limits, no hemorrhoids present  Inguinal Lymph Nodes:     No lymphadenopathy present  Pubic Hair:     Normal pubic hair distribution for age  Genitalia and Groin:     No rashes present, no lesions present, no areas of discoloration, no masses present      COUNSELING:   Reviewed preventive health counseling, as reflected in patient instructions       Regular exercise       Healthy diet/nutrition    BMI: Body mass index is 31.42 kg/m .  Weight management plan:  Discussed healthy diet and exercise guidelines    ASSESSMENT:  44 year old female with satisfactory annual exam.    ICD-10-CM    1. Encounter for gynecological examination without abnormal finding  Z01.419        PLAN:  Refill valtrex and celexa  Discussed covid precautions  Pap is up to date, normal in 2018  Has Essure  Return 1 yr    Nikole Wen MD

## 2020-07-15 ENCOUNTER — OFFICE VISIT (OUTPATIENT)
Dept: OBGYN | Facility: CLINIC | Age: 45
End: 2020-07-15
Payer: COMMERCIAL

## 2020-07-15 VITALS
HEART RATE: 68 BPM | SYSTOLIC BLOOD PRESSURE: 118 MMHG | WEIGHT: 219 LBS | DIASTOLIC BLOOD PRESSURE: 74 MMHG | HEIGHT: 70 IN | BODY MASS INDEX: 31.35 KG/M2

## 2020-07-15 DIAGNOSIS — Z01.419 ENCOUNTER FOR GYNECOLOGICAL EXAMINATION WITHOUT ABNORMAL FINDING: Primary | ICD-10-CM

## 2020-07-15 DIAGNOSIS — F32.89 OTHER DEPRESSION: ICD-10-CM

## 2020-07-15 DIAGNOSIS — A60.04 HERPES SIMPLEX VULVOVAGINITIS: ICD-10-CM

## 2020-07-15 PROCEDURE — 99396 PREV VISIT EST AGE 40-64: CPT | Performed by: OBSTETRICS & GYNECOLOGY

## 2020-07-15 RX ORDER — CITALOPRAM HYDROBROMIDE 40 MG/1
40 TABLET ORAL DAILY
Qty: 90 TABLET | Refills: 3 | Status: SHIPPED | OUTPATIENT
Start: 2020-07-15 | End: 2020-09-28

## 2020-07-15 RX ORDER — VALACYCLOVIR HYDROCHLORIDE 500 MG/1
500 TABLET, FILM COATED ORAL 2 TIMES DAILY
Qty: 30 TABLET | Refills: 3 | Status: SHIPPED | OUTPATIENT
Start: 2020-07-15 | End: 2021-09-29

## 2020-07-15 SDOH — HEALTH STABILITY: MENTAL HEALTH: HOW MANY STANDARD DRINKS CONTAINING ALCOHOL DO YOU HAVE ON A TYPICAL DAY?: 1 OR 2

## 2020-07-15 SDOH — HEALTH STABILITY: MENTAL HEALTH: HOW OFTEN DO YOU HAVE 6 OR MORE DRINKS ON ONE OCCASION?: NEVER

## 2020-07-15 SDOH — HEALTH STABILITY: MENTAL HEALTH: HOW OFTEN DO YOU HAVE A DRINK CONTAINING ALCOHOL?: 2-3 TIMES A WEEK

## 2020-07-15 ASSESSMENT — MIFFLIN-ST. JEOR: SCORE: 1728.63

## 2020-07-15 ASSESSMENT — PATIENT HEALTH QUESTIONNAIRE - PHQ9
5. POOR APPETITE OR OVEREATING: NOT AT ALL
SUM OF ALL RESPONSES TO PHQ QUESTIONS 1-9: 4

## 2020-07-15 ASSESSMENT — ANXIETY QUESTIONNAIRES
6. BECOMING EASILY ANNOYED OR IRRITABLE: NOT AT ALL
IF YOU CHECKED OFF ANY PROBLEMS ON THIS QUESTIONNAIRE, HOW DIFFICULT HAVE THESE PROBLEMS MADE IT FOR YOU TO DO YOUR WORK, TAKE CARE OF THINGS AT HOME, OR GET ALONG WITH OTHER PEOPLE: NOT DIFFICULT AT ALL
7. FEELING AFRAID AS IF SOMETHING AWFUL MIGHT HAPPEN: NOT AT ALL
2. NOT BEING ABLE TO STOP OR CONTROL WORRYING: NOT AT ALL
5. BEING SO RESTLESS THAT IT IS HARD TO SIT STILL: NOT AT ALL
GAD7 TOTAL SCORE: 0
3. WORRYING TOO MUCH ABOUT DIFFERENT THINGS: NOT AT ALL
1. FEELING NERVOUS, ANXIOUS, OR ON EDGE: NOT AT ALL

## 2020-07-16 ASSESSMENT — ANXIETY QUESTIONNAIRES: GAD7 TOTAL SCORE: 0

## 2020-08-24 ENCOUNTER — TELEPHONE (OUTPATIENT)
Dept: OBGYN | Facility: CLINIC | Age: 45
End: 2020-08-24

## 2020-08-24 NOTE — TELEPHONE ENCOUNTER
Patient requesting letter from Dr. Wen indicating that her dogs are emotional support animals for air travel.

## 2020-09-24 ENCOUNTER — OFFICE VISIT (OUTPATIENT)
Dept: ORTHOPEDICS | Facility: CLINIC | Age: 45
End: 2020-09-24
Payer: COMMERCIAL

## 2020-09-24 ENCOUNTER — ANCILLARY PROCEDURE (OUTPATIENT)
Dept: GENERAL RADIOLOGY | Facility: CLINIC | Age: 45
End: 2020-09-24
Attending: PEDIATRICS
Payer: COMMERCIAL

## 2020-09-24 VITALS
DIASTOLIC BLOOD PRESSURE: 78 MMHG | HEIGHT: 70 IN | SYSTOLIC BLOOD PRESSURE: 120 MMHG | WEIGHT: 219 LBS | BODY MASS INDEX: 31.35 KG/M2

## 2020-09-24 DIAGNOSIS — M54.2 CERVICALGIA: Primary | ICD-10-CM

## 2020-09-24 DIAGNOSIS — M54.2 NECK PAIN, ACUTE: ICD-10-CM

## 2020-09-24 PROCEDURE — 72040 X-RAY EXAM NECK SPINE 2-3 VW: CPT

## 2020-09-24 PROCEDURE — 99204 OFFICE O/P NEW MOD 45 MIN: CPT | Performed by: PEDIATRICS

## 2020-09-24 RX ORDER — METHOCARBAMOL 750 MG/1
750 TABLET, FILM COATED ORAL 3 TIMES DAILY PRN
Qty: 30 TABLET | Refills: 0 | Status: SHIPPED | OUTPATIENT
Start: 2020-09-24 | End: 2021-09-29

## 2020-09-24 ASSESSMENT — MIFFLIN-ST. JEOR: SCORE: 1728.63

## 2020-09-24 NOTE — RESULT ENCOUNTER NOTE
These results were discussed during office visit.    Loren Hoffman MD, CAQ  Primary Care Sports Medicine  [facility]

## 2020-09-24 NOTE — LETTER
9/24/2020         RE: Nga Vega  [address] [facility] [address]        Dear Colleague,    Thank you for referring your patient, Nga Vega, to the [facility] [place]. Please see a copy of my visit note below.    Sports Medicine Clinic Visit    PCP: Nikole Wen    Nga Vega is a 44 year old female who is seen  as a self referral presenting with neck pain.    Injury: She reports neck pain for 2 months with no injury. She states it started as a stiff neck but the pain has increased. She reports right sided neck pain with pain and tingling to her right hand only occasionally.    Location of Pain: right neck and arm  Duration of Pain: 2 month(s)  Rating of Pain at worst: 8/10  Rating of Pain Currently: 5/10  Symptoms are better with: stretching and heat  Symptoms are worse with: cervical rotation and use of right arm  Additional Features:   Positive: paresthesias and weakness occasionally   Negative: swelling, bruising, popping, grinding, catching, locking, instability and numbness  Other evaluation and/or treatments so far consists of: Ibuprofen  Prior History of related problems: nothing    Social History: Medical assistant.     Review of Systems  Skin: no bruising, no swelling  Musculoskeletal: as above  Neurologic: occasional numbness, paresthesias  Remainder of review of systems is negative including constitutional, CV, pulmonary, GI, except as noted in HPI or medical history.    Patient's current problem list, past medical and surgical history, and family history were reviewed.    Patient Active Problem List   Diagnosis     Cervical cancer screening     Past Medical History:   Diagnosis Date     Allergic rhinitis      Chronic back pain      Depression      Genital herpes      Pap smear abnormality of vagina with ASC-US      Past Surgical History:   Procedure Laterality Date     AS HYSTEROS W PERMANENT FALLOPAIN IMPLANT       GYN SURGERY      4  "vaginal deliveries     MAMMOPLASTY REDUCTION BILATERAL Bilateral 8/16/2016    Procedure: MAMMOPLASTY REDUCTION BILATERAL;  Surgeon: Andrew Craig MD;  [facility]: [facility]     ORTHOPEDIC SURGERY       Family History   Problem Relation Age of Onset     Heart Disease Mother      Hypertension Mother      Diabetes Father      Hypertension Father      Colon Cancer Maternal Grandmother        Objective  /78   Ht 1.778 m (5' 10\")   Wt 99.3 kg (219 lb)   BMI 31.42 kg/m      GENERAL APPEARANCE: healthy, alert and no distress   GAIT: NORMAL  SKIN: no suspicious lesions or rashes  HEENT: Sclera clear, anicteric  CV: good peripheral pulses  RESP: Breathing not labored  NEURO: Normal strength and tone, mentation intact and speech normal  PSYCH:  mentation appears normal and affect normal/bright    Cervical Spine Exam  Inspection:       No visible deformity        normal lordotic curvature maintained    Posture:      rounded shoulders and upper back    Tender:      paraspinal muscles       upper border of trapezius    Non-Tender:      remainder of cervical spine area    Range of Motion:       Full active and passive ROM forward flexion, extension, lateral rotation, lateral flexion.    Painful Motions:      none    Strength:     C4 (shoulder shrug)  symmetric 5/5       C5 (shoulder abduction) symmetric 5/5       C6 (elbow flexion) symmetric 5/5       C7 (elbow extension) symmetric 5/5       C8 (finger abduction, thumb flexion) symmetric 5/5    Reflexes:      C5 (biceps) symmetric normal       C6 (supinator) symmetric normal       C7 (triceps) symmetric normal    Sensation:     grossly intact througout bilateral upper extremities    Special Tests:      neg (-) Spurling    Skin:     well perfused       capillary refill brisk    Lymphatics:      no edema noted in the upper extremities     Radiology  I ordered, visualized and reviewed these images with the patient  3 XR views of cervical spine reviewed: no acute bony " abnormality, no significant degenerative change, straightening of normal lordosis  - will follow official read      Assessment:  1. Cervicalgia        Plan:  - Today's Plan of Care:  Topical Treatments: Ice or Heat  Over the counter medication: Ibuprofen (Advil) maximum of 800mg three times a day with food OR  Naproxen (Aleve) maximum of 440mg two times a day with food  Prescription Medication as directed: Methocarbamol  Rehab: Physical Therapy: [facility] - [phone]    -We also discussed other future treatment options:  MRI    Follow Up: 6 - 8 weeks and as needed    Concerning signs and symptoms were reviewed.  The patient expressed understanding of this management plan and all questions were answered at this time.    Loren Hoffman MD CAQ  Primary Care Sports Medicine  [facility]    Again, thank you for allowing me to participate in the care of your patient.        Sincerely,        Loren Hoffman MD

## 2020-09-24 NOTE — PATIENT INSTRUCTIONS
Plan:  - Today's Plan of Care:  Topical Treatments: Ice or Heat  Over the counter medication: Ibuprofen (Advil) maximum of 800mg three times a day with food OR  Naproxen (Aleve) maximum of 440mg two times a day with food  Prescription Medication as directed: Methocarbamol  Rehab: Physical Therapy: [facility] - [phone]    -We also discussed other future treatment options:  MRI    Follow Up: 6 - 8 weeks and as needed    If you have any further questions for your physician or physician s care team you can call [phone] and use option 3 to leave a voice message. Calls received during business hours will be returned same day.

## 2020-09-24 NOTE — PROGRESS NOTES
Sports Medicine Clinic Visit    PCP: Nikole Wen    Nga Vega is a 44 year old female who is seen  as a self referral presenting with neck pain.    Injury: She reports neck pain for 2 months with no injury. She states it started as a stiff neck but the pain has increased. She reports right sided neck pain with pain and tingling to her right hand only occasionally.    Location of Pain: right neck and arm  Duration of Pain: 2 month(s)  Rating of Pain at worst: 8/10  Rating of Pain Currently: 5/10  Symptoms are better with: stretching and heat  Symptoms are worse with: cervical rotation and use of right arm  Additional Features:   Positive: paresthesias and weakness occasionally   Negative: swelling, bruising, popping, grinding, catching, locking, instability and numbness  Other evaluation and/or treatments so far consists of: Ibuprofen  Prior History of related problems: nothing    Social History: Medical assistant.     Review of Systems  Skin: no bruising, no swelling  Musculoskeletal: as above  Neurologic: occasional numbness, paresthesias  Remainder of review of systems is negative including constitutional, CV, pulmonary, GI, except as noted in HPI or medical history.    Patient's current problem list, past medical and surgical history, and family history were reviewed.    Patient Active Problem List   Diagnosis     Cervical cancer screening     Past Medical History:   Diagnosis Date     Allergic rhinitis      Chronic back pain      Depression      Genital herpes      Pap smear abnormality of vagina with ASC-US      Past Surgical History:   Procedure Laterality Date     AS HYSTEROS W PERMANENT FALLOPAIN IMPLANT       GYN SURGERY      4 vaginal deliveries     MAMMOPLASTY REDUCTION BILATERAL Bilateral 8/16/2016    Procedure: MAMMOPLASTY REDUCTION BILATERAL;  Surgeon: Andrew Craig MD;  Location: [facility]     ORTHOPEDIC SURGERY       Family History   Problem Relation Age of Onset     Heart Disease  "Mother      Hypertension Mother      Diabetes Father      Hypertension Father      Colon Cancer Maternal Grandmother        Objective  /78   Ht 1.778 m (5' 10\")   Wt 99.3 kg (219 lb)   BMI 31.42 kg/m      GENERAL APPEARANCE: healthy, alert and no distress   GAIT: NORMAL  SKIN: no suspicious lesions or rashes  HEENT: Sclera clear, anicteric  CV: good peripheral pulses  RESP: Breathing not labored  NEURO: Normal strength and tone, mentation intact and speech normal  PSYCH:  mentation appears normal and affect normal/bright    Cervical Spine Exam  Inspection:       No visible deformity        normal lordotic curvature maintained    Posture:      rounded shoulders and upper back    Tender:      paraspinal muscles       upper border of trapezius    Non-Tender:      remainder of cervical spine area    Range of Motion:       Full active and passive ROM forward flexion, extension, lateral rotation, lateral flexion.    Painful Motions:      none    Strength:     C4 (shoulder shrug)  symmetric 5/5       C5 (shoulder abduction) symmetric 5/5       C6 (elbow flexion) symmetric 5/5       C7 (elbow extension) symmetric 5/5       C8 (finger abduction, thumb flexion) symmetric 5/5    Reflexes:      C5 (biceps) symmetric normal       C6 (supinator) symmetric normal       C7 (triceps) symmetric normal    Sensation:     grossly intact througout bilateral upper extremities    Special Tests:      neg (-) Spurling    Skin:     well perfused       capillary refill brisk    Lymphatics:      no edema noted in the upper extremities     Radiology  I ordered, visualized and reviewed these images with the patient  3 XR views of cervical spine reviewed: no acute bony abnormality, no significant degenerative change, straightening of normal lordosis  - will follow official read      Assessment:  1. Cervicalgia        Plan:  - Today's Plan of Care:  Topical Treatments: Ice or Heat  Over the counter medication: Ibuprofen (Advil) maximum of " 800mg three times a day with food OR  Naproxen (Aleve) maximum of 440mg two times a day with food  Prescription Medication as directed: Methocarbamol  Rehab: Physical Therapy: [facility] - [phone]    -We also discussed other future treatment options:  MRI    Follow Up: 6 - 8 weeks and as needed    Concerning signs and symptoms were reviewed.  The patient expressed understanding of this management plan and all questions were answered at this time.    Loren Hoffman MD CAQ  Primary Care Sports Medicine  [facility]

## 2020-09-27 DIAGNOSIS — F32.89 OTHER DEPRESSION: ICD-10-CM

## 2020-09-28 RX ORDER — CITALOPRAM HYDROBROMIDE 40 MG/1
TABLET ORAL
Qty: 90 TABLET | Refills: 3 | Status: SHIPPED | OUTPATIENT
Start: 2020-09-28 | End: 2021-09-09

## 2020-09-28 NOTE — TELEPHONE ENCOUNTER
Prescription approved per [facility] Refill Protocol.  [facility].      Kavita Damon RN on 9/28/2020 at 10:35 AM

## 2020-09-28 NOTE — TELEPHONE ENCOUNTER
"Requested Prescriptions   Pending Prescriptions Disp Refills     citalopram (CELEXA) 40 MG tablet [Pharmacy Med Name: CITALOPRAM 40MG TABLETS] 90 tablet 3     Sig: TAKE 1 TABLET(40 MG) BY MOUTH DAILY       [facility] Protocol Passed - 9/27/2020  3:57 AM        Passed - PHQ-9 score less than 5 in past 6 months     Please review last PHQ-9 score.           Passed - Medication is active on med list        Passed - Patient is age 18 or older        Passed - No active pregnancy on record        Passed - No positive pregnancy test in last 12 months        Passed - Recent (6 mo) or future (30 days) visit within the authorizing provider's specialty     Patient had office visit in the last 6 months or has a visit in the next 30 days with authorizing provider or within the authorizing provider's specialty.  See \"Patient Info\" tab in inbasket, or \"Choose Columns\" in Meds & Orders section of the refill encounter.               Last Written Prescription Date:  07/15/2020  Last Fill Quantity: 90,  # refills: 3   Last office visit: 7/15/2020 with prescribing provider:  Dr. Wen   Future Office Visit:            "

## 2020-11-06 ENCOUNTER — TELEPHONE (OUTPATIENT)
Dept: OBGYN | Facility: CLINIC | Age: 45
End: 2020-11-06

## 2020-11-06 NOTE — TELEPHONE ENCOUNTER
Last 2 years she has seen Dr Wen and she has   Has 2 small animals at home that provide emotional support and help with her anxiety. She would benefit having the 2 dogs accompany her during traveling - has a big [place] trip planned in the next few months and trying to get the dogs approved to come and be able to sit on her lap as well.    She asked back in August and after Dr Wen had refused previously she did go other routes - went to behavioral health who referred her to go back to her provider who prescribes her medications - Dr Wen.    Citalopram for the last 2 years from Dr Wen and she knows her health hx and hx of anxiety. Just pleading for Dr Wen to approve a emotional support letter.  She feels a letter that indicates her dogs, anxiety and the long trip and the benefit of the animals providing emotional support and assistance with her anxiety would be sufficient.    Routing to Dr Wen to advise.    Clarissa Jason RN on 11/6/2020 at 4:24 PM

## 2020-11-10 NOTE — TELEPHONE ENCOUNTER
I do not agree with send dogs on an airplane during a covid pandemic is acceptable. Dogs carry coronavirus just as easily as humans and can not wear masks.     I am not willing to do this letter. I can not in good conscience say that it is acceptable to recommend this. \

## 2020-11-29 ENCOUNTER — HEALTH MAINTENANCE LETTER (OUTPATIENT)
Age: 45
End: 2020-11-29

## 2021-03-17 ENCOUNTER — OFFICE VISIT (OUTPATIENT)
Dept: PODIATRY | Facility: CLINIC | Age: 46
End: 2021-03-17
Payer: COMMERCIAL

## 2021-03-17 ENCOUNTER — ANCILLARY PROCEDURE (OUTPATIENT)
Dept: GENERAL RADIOLOGY | Facility: CLINIC | Age: 46
End: 2021-03-17
Attending: PODIATRIST
Payer: COMMERCIAL

## 2021-03-17 VITALS
DIASTOLIC BLOOD PRESSURE: 80 MMHG | BODY MASS INDEX: 31.5 KG/M2 | HEART RATE: 78 BPM | WEIGHT: 220 LBS | SYSTOLIC BLOOD PRESSURE: 120 MMHG | HEIGHT: 70 IN

## 2021-03-17 DIAGNOSIS — M79.672 LEFT FOOT PAIN: Primary | ICD-10-CM

## 2021-03-17 DIAGNOSIS — M79.672 LEFT FOOT PAIN: ICD-10-CM

## 2021-03-17 PROCEDURE — 73630 X-RAY EXAM OF FOOT: CPT | Mod: LT | Performed by: RADIOLOGY

## 2021-03-17 PROCEDURE — 99203 OFFICE O/P NEW LOW 30 MIN: CPT | Performed by: PODIATRIST

## 2021-03-17 ASSESSMENT — MIFFLIN-ST. JEOR: SCORE: 1728.16

## 2021-03-17 NOTE — LETTER
3/17/2021         RE: Nga Vega  [address] [facility] [address]        Dear Colleague,    Thank you for referring your patient, Nga Vega, to the [facility]. Please see a copy of my visit note below.    PATIENT HISTORY:  Nga Vega is a 45 year old female who presents to clinic for left foot pain.  1 to 2 months duration.  Dorsal lateral area.  No injury.  Worse with activity, better with rest.  Reports walking barefoot frequently.  Pain is 2-6 out of 10.    Review of Systems:  Patient denies fever, chills, rash, wound, stiffness, limping, numbness, weakness, heart burn, blood in stool, chest pain with activity, calf pain when walking, shortness of breath with activity, chronic cough, easy bleeding/bruising, swelling of ankles, excessive thirst, fatigue, depression, anxiety.       PAST MEDICAL HISTORY:   Past Medical History:   Diagnosis Date     Allergic rhinitis      Chronic back pain      Depression      Genital herpes      Pap smear abnormality of vagina with ASC-US         PAST SURGICAL HISTORY:   Past Surgical History:   Procedure Laterality Date     AS HYSTEROS W PERMANENT FALLOPAIN IMPLANT       GYN SURGERY      4 vaginal deliveries     MAMMOPLASTY REDUCTION BILATERAL Bilateral 8/16/2016    Procedure: MAMMOPLASTY REDUCTION BILATERAL;  Surgeon: Andrew Craig MD;  Location: [facility]     ORTHOPEDIC SURGERY          MEDICATIONS:   Current Outpatient Medications:      ACYCLOVIR PO, Take 800 mg by mouth 2 times daily, Disp: , Rfl:      citalopram (CELEXA) 40 MG tablet, TAKE 1 TABLET(40 MG) BY MOUTH DAILY, Disp: 90 tablet, Rfl: 3     fluticasone (FLONASE) 50 MCG/ACT spray, Spray 2 sprays into both nostrils daily (Patient not taking: Reported on 7/15/2020), Disp: 1 Bottle, Rfl: 0     methocarbamol (ROBAXIN) 750 MG tablet, Take 1 tablet (750 mg) by mouth 3 times daily as needed for muscle spasms, Disp: 30 tablet, Rfl: 0     valACYclovir (VALTREX) 500  MG tablet, Take 1 tablet (500 mg) by mouth 2 times daily, Disp: 30 tablet, Rfl: 3     ALLERGIES:    Allergies   Allergen Reactions     Cats Itching        SOCIAL HISTORY:   Social History     Socioeconomic History     Marital status:      Spouse name: Not on file     Number of children: Not on file     Years of education: Not on file     Highest education level: Not on file   Occupational History     Not on file   Social Needs     Financial resource strain: Not on file     Food insecurity     Worry: Not on file     Inability: Not on file     Transportation needs     Medical: Not on file     Non-medical: Not on file   Tobacco Use     Smoking status: Never Smoker     Smokeless tobacco: Never Used   Substance and Sexual Activity     Alcohol use: Yes     Frequency: 2-3 times a week     Drinks per session: 1 or 2     Binge frequency: Never     Comment: 1-2/week     Drug use: No     Sexual activity: Yes     Partners: Male     Comment: [facility]   Lifestyle     Physical activity     Days per week: Not on file     Minutes per session: Not on file     Stress: Not on file   Relationships     Social connections     Talks on phone: Not on file     Gets together: Not on file     Attends L[employer]-day service: Not on file     Active member of club or organization: Not on file     Attends meetings of clubs or organizations: Not on file     Relationship status: Not on file     Intimate partner violence     Fear of current or ex partner: Not on file     Emotionally abused: Not on file     Physically abused: Not on file     Forced sexual activity: Not on file   Other Topics Concern     Parent/sibling w/ CABG, MI or angioplasty before 65F 55M? Not Asked   Social History Narrative     Not on file        FAMILY HISTORY:   Family History   Problem Relation Age of Onset     Heart Disease Mother      Hypertension Mother      Diabetes Father      Hypertension Father      Colon Cancer Maternal Grandmother         EXAM:Vitals: /80    "Pulse 78   Ht 1.778 m (5' 10\")   Wt 99.8 kg (220 lb)   BMI 31.57 kg/m    BMI= Body mass index is 31.57 kg/m .    General appearance: Patient is alert and fully cooperative with history & exam.  No sign of distress is noted during the visit.     Psychiatric: Affect is pleasant & appropriate.  Patient appears motivated to improve health.     Respiratory: Breathing is regular & unlabored while sitting.     HEENT: Hearing is intact to spoken word.  Speech is clear.  No gross evidence of visual impairment that would impact ambulation.     Dermatologic: Skin is intact to left foot without significant lesions, rash or abrasion.  No paronychia or evidence of soft tissue infection is noted.     Vascular: DP & PT pulses are intact & regular on the left.  No significant edema or varicosities noted.  CFT and skin temperature are normal to both lower extremities.     Neurologic: Lower extremity sensation is intact to light touch.  No evidence of weakness or contracture in the lower extremities.  No evidence of neuropathy.     Musculoskeletal: Left foot pain with palpation over the dorsal third metatarsal area.  I cannot locate any other foot pain with palpation.  This included the left second and third interspaces.  Patient is ambulatory without assistive device or brace.  No gross ankle deformity noted.  No foot or ankle joint effusion is noted.  Decreased arch height with standing bilaterally.    X-rays of left foot reviewed with patient.  No acute findings.     ASSESSMENT: L foot pain, differential includes stress reaction/fracture, neuroma.     PLAN:  Reviewed patient's chart in epic.  Discussed condition and treatment options including pros and cons.    Etiology of left foot pain not entirely clear at this point.  We discussed possibility of stress reaction, early stress fracture.  Neuroma is also possibility given the location of pain but this is not a typical neuroma presentation.    PRICE advised.  Offered stiff soled " postop shoe or short walking boot.  Patient declines for now.  I advised she wear a stiff soled regular shoe.  Avoid barefoot walking.  Super feet inserts advised.  Follow-up in 2 to 3 weeks if worsening or failing to improve.  May consider repeat x-ray or MRI.  Patient agrees with plan.  Follow-up as needed.     Darius Yuan DPM, FACFAS      Disclaimer: This note consists of symbols derived from keyboarding, dictation and/or voice recognition software. As a result, there may be errors in the script that have gone undetected. Please consider this when interpreting information found in this chart.        Again, thank you for allowing me to participate in the care of your patient.        Sincerely,        Darius Yuan DPM

## 2021-03-17 NOTE — PATIENT INSTRUCTIONS
"Follow up in 2-3 weeks as needed      OVER THE COUNTER INSERTS    Most of these can be found at your local [employer], [employer], [employer], sporting [employer], or online:    SuperFeet   Sofsole Fit Spenco   Power Step   Walk-Fit ([employer])  *For heel pain* Arch Cradles  *For heel pain*       ** A good high quality over the counter insert should cost around $40-$50    ** Capsulitis/Metarasalgia - try adding a metatarsal pad on your inserts or find an insert with one built in          PRICE Therapy    Many aches and pains throughout the foot and ankle can be helped with many simple treatments.  This is usually described as PRICE Therapy.      P - Protection - often times, inflammation/pain in the lower extremity is not able to improve simply because the areas involved are never allowed to rest.  Every step we take can bother the problematic area.  Protecting those areas is an important step in the healing process.  This may involve a walking cast boot, a special insert/orthotic device, an ankle brace, or simply avoiding barefoot walking.    R - Rest - in addition to protecting the foot/ankle, resting is an important, but often times difficult, treatment option.  Getting off your feet when they bother you, and specifically avoiding activities that cause pain/discomfort, are very beneficial to prevent, and treat, foot/ankle pain.  \"If there's something that makes it hurt(eg activities, shoe gear), and you keep doing the thing that makes it hurt, it's just going to keep hurting\".      I - Ice - icing regularly can help to decrease inflammation and swelling in the foot, thus decreasing pain.  Using an ice pack or a bag of frozen peas works very well.  Ice for 20 minutes multiple times per day as needed.  Do not place the ice directly on the skin as this can cause tissue damage.    C - Compression - using a compression wrap or an ACE wrap can help to decrease swelling, which can help to decrease pain.  Wearing " the wraps is generally not needed at night, but they should be worn on a regular basis when you are going to be on your feet for prolonged periods as gravity tends to pull fluids down to your feet/ankles.    E - Elevation - elevating your lower extremities multiple times daily for 15-20 minutes can help to decrease swelling, which works well in decreasing pain levels.      NSAID/Tylenol - An anti-inflammatory, like Aleve or ibuprofen, and/or a pain medication, such as Tylenol, can help to improve pain levels and get the issue resolved sooner rather than later.  Anyone with liver issues should be careful with Tylenol, and anyone with high blood pressure or heart, stomach or kidney issues should be careful with anti-inflammatories.  Please ask if you have questions about these medications, including dosage.

## 2021-03-17 NOTE — PROGRESS NOTES
PATIENT HISTORY:  Nga Vega is a 45 year old female who presents to clinic for left foot pain.  1 to 2 months duration.  Dorsal lateral area.  No injury.  Worse with activity, better with rest.  Reports walking barefoot frequently.  Pain is 2-6 out of 10.    Review of Systems:  Patient denies fever, chills, rash, wound, stiffness, limping, numbness, weakness, heart burn, blood in stool, chest pain with activity, calf pain when walking, shortness of breath with activity, chronic cough, easy bleeding/bruising, swelling of ankles, excessive thirst, fatigue, depression, anxiety.       PAST MEDICAL HISTORY:   Past Medical History:   Diagnosis Date     Allergic rhinitis      Chronic back pain      Depression      Genital herpes      Pap smear abnormality of vagina with ASC-US         PAST SURGICAL HISTORY:   Past Surgical History:   Procedure Laterality Date     AS HYSTEROS W PERMANENT FALLOPAIN IMPLANT       GYN SURGERY      4 vaginal deliveries     MAMMOPLASTY REDUCTION BILATERAL Bilateral 8/16/2016    Procedure: MAMMOPLASTY REDUCTION BILATERAL;  Surgeon: Andrew Craig MD;  Location: [facility]     ORTHOPEDIC SURGERY          MEDICATIONS:   Current Outpatient Medications:      ACYCLOVIR PO, Take 800 mg by mouth 2 times daily, Disp: , Rfl:      citalopram (CELEXA) 40 MG tablet, TAKE 1 TABLET(40 MG) BY MOUTH DAILY, Disp: 90 tablet, Rfl: 3     fluticasone (FLONASE) 50 MCG/ACT spray, Spray 2 sprays into both nostrils daily (Patient not taking: Reported on 7/15/2020), Disp: 1 Bottle, Rfl: 0     methocarbamol (ROBAXIN) 750 MG tablet, Take 1 tablet (750 mg) by mouth 3 times daily as needed for muscle spasms, Disp: 30 tablet, Rfl: 0     valACYclovir (VALTREX) 500 MG tablet, Take 1 tablet (500 mg) by mouth 2 times daily, Disp: 30 tablet, Rfl: 3     ALLERGIES:    Allergies   Allergen Reactions     Cats Itching        SOCIAL HISTORY:   Social History     Socioeconomic History     Marital status:      Spouse name:  "Not on file     Number of children: Not on file     Years of education: Not on file     Highest education level: Not on file   Occupational History     Not on file   Social Needs     Financial resource strain: Not on file     Food insecurity     Worry: Not on file     Inability: Not on file     Transportation needs     Medical: Not on file     Non-medical: Not on file   Tobacco Use     Smoking status: Never Smoker     Smokeless tobacco: Never Used   Substance and Sexual Activity     Alcohol use: Yes     Frequency: 2-3 times a week     Drinks per session: 1 or 2     Binge frequency: Never     Comment: 1-2/week     Drug use: No     Sexual activity: Yes     Partners: Male     Comment: Essure   Lifestyle     Physical activity     Days per week: Not on file     Minutes per session: Not on file     Stress: Not on file   Relationships     Social connections     Talks on phone: Not on file     Gets together: Not on file     Attends [employer] service: Not on file     Active member of club or organization: Not on file     Attends meetings of clubs or organizations: Not on file     Relationship status: Not on file     Intimate partner violence     Fear of current or ex partner: Not on file     Emotionally abused: Not on file     Physically abused: Not on file     Forced sexual activity: Not on file   Other Topics Concern     Parent/sibling w/ CABG, MI or angioplasty before 65F 55M? Not Asked   Social History Narrative     Not on file        FAMILY HISTORY:   Family History   Problem Relation Age of Onset     Heart Disease Mother      Hypertension Mother      Diabetes Father      Hypertension Father      Colon Cancer Maternal Grandmother         EXAM:Vitals: /80   Pulse 78   Ht 1.778 m (5' 10\")   Wt 99.8 kg (220 lb)   BMI 31.57 kg/m    BMI= Body mass index is 31.57 kg/m .    General appearance: Patient is alert and fully cooperative with history & exam.  No sign of distress is noted during the visit.     Psychiatric: " Affect is pleasant & appropriate.  Patient appears motivated to improve health.     Respiratory: Breathing is regular & unlabored while sitting.     HEENT: Hearing is intact to spoken word.  Speech is clear.  No gross evidence of visual impairment that would impact ambulation.     Dermatologic: Skin is intact to left foot without significant lesions, rash or abrasion.  No paronychia or evidence of soft tissue infection is noted.     Vascular: DP & PT pulses are intact & regular on the left.  No significant edema or varicosities noted.  CFT and skin temperature are normal to both lower extremities.     Neurologic: Lower extremity sensation is intact to light touch.  No evidence of weakness or contracture in the lower extremities.  No evidence of neuropathy.     Musculoskeletal: Left foot pain with palpation over the dorsal third metatarsal area.  I cannot locate any other foot pain with palpation.  This included the left second and third interspaces.  Patient is ambulatory without assistive device or brace.  No gross ankle deformity noted.  No foot or ankle joint effusion is noted.  Decreased arch height with standing bilaterally.    X-rays of left foot reviewed with patient.  No acute findings.     ASSESSMENT: L foot pain, differential includes stress reaction/fracture, neuroma.     PLAN:  Reviewed patient's chart in epic.  Discussed condition and treatment options including pros and cons.    Etiology of left foot pain not entirely clear at this point.  We discussed possibility of stress reaction, early stress fracture.  Neuroma is also possibility given the location of pain but this is not a typical neuroma presentation.    PRICE advised.  Offered stiff soled postop shoe or short walking boot.  Patient declines for now.  I advised she wear a stiff soled regular shoe.  Avoid barefoot walking.  Super feet inserts advised.  Follow-up in 2 to 3 weeks if worsening or failing to improve.  May consider repeat x-ray or  MRI.  Patient agrees with plan.  Follow-up as needed.     Darius Yuan DPM, FACFAS      Disclaimer: This note consists of symbols derived from keyboarding, dictation and/or voice recognition software. As a result, there may be errors in the script that have gone undetected. Please consider this when interpreting information found in this chart.

## 2021-09-09 DIAGNOSIS — F32.89 OTHER DEPRESSION: ICD-10-CM

## 2021-09-09 RX ORDER — CITALOPRAM HYDROBROMIDE 40 MG/1
TABLET ORAL
Qty: 30 TABLET | Refills: 0 | Status: SHIPPED | OUTPATIENT
Start: 2021-09-09

## 2021-09-09 NOTE — TELEPHONE ENCOUNTER
"Requested Prescriptions   Pending Prescriptions Disp Refills     citalopram (CELEXA) 40 MG tablet [Pharmacy Med Name: CITALOPRAM 40MG TABLETS] 90 tablet 3     Sig: TAKE 1 TABLET(40 MG) BY MOUTH DAILY       SSRIs Protocol Failed - 9/9/2021  4:01 PM        Failed - PHQ-9 score less than 5 in past 6 months     Please review last PHQ-9 score.           Failed - Recent (6 mo) or future (30 days) visit within the authorizing provider's specialty     Patient had office visit in the last 6 months or has a visit in the next 30 days with authorizing provider or within the authorizing provider's specialty.  See \"Patient Info\" tab in inbasket, or \"Choose Columns\" in Meds & Orders section of the refill encounter.            Passed - Medication is active on med list        Passed - Patient is age 18 or older        Passed - No active pregnancy on record        Passed - No positive pregnancy test in last 12 months           Last Written Prescription Date:  9/28/20  Last Fill Quantity: 90,  # refills: 3   Last office visit: 7/15/2020 with prescribing provider:  Dr Wen   Future Office Visit:      Medication is being filled for 1 time refill only due to:  Patient needs to be seen because it has been more than one year since last visit.  Clarissa Jason RN on 9/9/2021 at 4:05 PM        "

## 2021-09-13 NOTE — PROGRESS NOTES
Nga is a 45 year old  female who presents for annual exam.     Besides routine health maintenance,  she would like to discuss perimenopausal and urinary sx.    HPI:  The patient doesn't have a PCP.     Has questions about perimenopause.  Has been having symptoms for about 4 years.  Just started a vitamin regimen about 2 weeks.  Feels irritable and more emotional.  Having more headaches, night sweats, nausea.  Last period was may or .  No change in bowel habits.    Additionally patient has been having symptoms of urinary tract infection.  She has noted increased frequency and urgency.  She denies hematuria.  She states that she had problems with bladder infections when she was younger.  She often only had urgency or frequency.      GYNECOLOGIC HISTORY:    No LMP recorded. Patient is perimenopausal.    Regular menses? No, states will go months with out a cycle and then randomly get one. Last cycle was either May or 2021.    Her current contraception method is: essure.  She  reports that she has never smoked. She has never used smokeless tobacco.    Patient is sexually active.  STD testing offered?  Declined  Last PHQ-9 score on record =   PHQ-9 SCORE 2021   PHQ-9 Total Score 6     Last GAD7 score on record =   ACOSTA-7 SCORE 2021   Total Score 5     Alcohol Score = 2    HEALTH MAINTENANCE:  Cholesterol: :  Blood   Ref Range & Units 10 yr ago   CHOLESTEROL,TOTAL 110 - 199 mg/dL 149        TRIGLYCERIDES 40 - 149 mg/dL 85        HDL CHOLESTEROL >40 mg/dL 43        CHOL/HDL RATIO            <4.51  3.47        LDL CHOLESTEROL <131 mg/dL 89      Last Mammo: One year ago, Result: Normal, Next Mammo: Pt declines this year, will do next year  Pap:   Lab Results   Component Value Date    PAP NIL, HPV- 2018     Colonoscopy:  NA, Result: Not applicable, Next Colonoscopy: 1 year.  Dexa:  NA    Health maintenance updated:  no    HISTORY:  OB History    Para Term  AB Living   4 4 4 0  0 4   SAB TAB Ectopic Multiple Live Births   0 0 0 0 4      # Outcome Date GA Lbr Hussein/2nd Weight Sex Delivery Anes PTL Lv   4 Term 03/04/08    F    ROBYN      Name: Leah   3 Term 10/21/03 40w0d 08:00 3.856 kg (8 lb 8 oz) F    ROBYN      Birth Comments: No complications in preg or with delivery.  Baby born healthy.  Wt gain of 25#.      Name: Coreen   2 Term 06/10/96 41w0d 06:00 3.969 kg (8 lb 12 oz) M Vag-Spont   ROBYN      Birth Comments: No complications in preg or with delivery.  Baby born healthy.  Wt gain of 25#.      Name: Herbert Addison Term 12/19/94 40w0d 15:00 3.714 kg (8 lb 3 oz) M Vag-Forceps   ROBYN      Birth Comments: Forceps and Vaccum used.  Hyperemesis.  No other complications.        Name: Benjamin       Patient Active Problem List   Diagnosis     Cervical cancer screening     Past Surgical History:   Procedure Laterality Date     AS HYSTEROS W PERMANENT FALLOPAIN IMPLANT       GYN SURGERY      4 vaginal deliveries     MAMMOPLASTY REDUCTION BILATERAL Bilateral 8/16/2016    Procedure: MAMMOPLASTY REDUCTION BILATERAL;  Surgeon: Andrew Craig MD;  Location: [facility]     ORTHOPEDIC SURGERY        Social History     Tobacco Use     Smoking status: Never Smoker     Smokeless tobacco: Never Used   Substance Use Topics     Alcohol use: Yes     Comment: 1-2/week      Problem (# of Occurrences) Relation (Name,Age of Onset)    Colon Cancer (1) Maternal Grandmother    Diabetes (1) Father    Heart Disease (1) Mother    Hypertension (2) Mother, Father    No Known Problems (6) Sister, Brother, Maternal Grandfather, Paternal Grandmother, Paternal Grandfather, Other            Current Outpatient Medications   Medication Sig     Ascorbic Acid (VITAMIN C PO)      BLACK CURRANT SEED OIL PO      EVENING PRIMROSE OIL PO      Flaxseed, Linseed, (FLAXSEED OIL PO)      Moringa Oleifera (MORINGA PO)      Multiple Vitamin (MULTIVITAMIN PO)      Multiple Vitamins-Minerals (ZINC PO)      nitroFURantoin macrocrystal-monohydrate  "(MACROBID) 100 MG capsule Take 1 capsule (100 mg) by mouth 2 times daily for 7 days     Pyridoxine HCl (B-6 PO)      TURMERIC PO      valACYclovir (VALTREX) 500 MG tablet Take 1 tablet (500 mg) by mouth 2 times daily     venlafaxine (EFFEXOR) 37.5 MG tablet Take 1 tablet (37.5 mg) by mouth 2 times daily     VITAMIN E PO      ACYCLOVIR PO Take 800 mg by mouth 2 times daily (Patient not taking: Reported on 9/29/2021)     citalopram (CELEXA) 40 MG tablet TAKE 1 TABLET(40 MG) BY MOUTH DAILY (Patient not taking: Reported on 9/29/2021)     fluticasone (FLONASE) 50 MCG/ACT spray Spray 2 sprays into both nostrils daily (Patient not taking: Reported on 7/15/2020)     No current facility-administered medications for this visit.     Allergies   Allergen Reactions     Cats Itching       Past medical, surgical, social and family histories were reviewed and updated in EPIC.    ROS:   12 point review of systems negative other than symptoms noted below or in the HPI.  Constitutional: Weight Gain  Gastrointestinal: Nausea  Genitourinary: Frequency, Irregular Menses, Night Sweats, No Periods, Urgency and Vaginal Dryness  Neurologic: Headaches  Psychiatric: Difficulty Sleeping and [place]    EXAM:  /74   Pulse 68   Ht 1.778 m (5' 10\")   Wt 99.4 kg (219 lb 3.2 oz)   BMI 31.45 kg/m     BMI: Body mass index is 31.45 kg/m .    PHYSICAL EXAM:  Constitutional:   Appearance: Well nourished, well developed, alert, in no acute distress  Neck:  Lymph Nodes:  No lymphadenopathy present    Thyroid:  Gland size normal, nontender, no nodules or masses present  on palpation  Chest:  Respiratory Effort:  Breathing unlabored  Cardiovascular:    Heart: Auscultation:  Regular rate, normal rhythm, no murmurs present  Breasts: Palpation of Breasts and Axillae:  No masses present on palpation, no breast tenderness, scars on both breasts due to reduction mammoplasty Axillary Lymph Nodes:  No lymphadenopathy present. and No nodularity, asymmetry or " nipple discharge bilaterally.  Gastrointestinal:   Abdominal Examination:  Abdomen nontender to palpation, tone normal without rigidity or guarding, no masses present, umbilicus without lesions   Liver and Spleen:  No hepatomegaly present, liver nontender to palpation    Hernias:  No hernias present  Lymphatic: Lymph Nodes:  No other lymphadenopathy present  Skin:  General Inspection:  No rashes present, no lesions present, no areas of  discoloration  Neurologic:    Mental Status:  Oriented X3.  Normal strength and tone, sensory exam                grossly normal, mentation intact and speech normal.    Psychiatric:   Mentation appears normal and affect normal/bright.         Pelvic Exam:  External Genitalia:     Normal appearance for age, no discharge present, no tenderness present, no inflammatory lesions present, color normal  Vagina:     Normal vaginal vault without central or paravaginal defects, no discharge present, no inflammatory lesions present, no masses present  Bladder:     Nontender to palpation  Urethra:   Urethral Body:  Urethra palpation normal, urethra structural support normal   Urethral Meatus:  No erythema or lesions present  Uterus:     Uterus: firm, normal sized and nontender, midplane in position.   Adnexa:     No adnexal tenderness present, no adnexal masses present  Perineum:     Perineum within normal limits, no evidence of trauma, no rashes or skin lesions present  Anus:     Anus within normal limits, no hemorrhoids present  Inguinal Lymph Nodes:     No lymphadenopathy present  Pubic Hair:     Normal pubic hair distribution for age  Genitalia and Groin:     No rashes present, no lesions present, no areas of discoloration, no masses present      COUNSELING:   Reviewed preventive health counseling, as reflected in patient instructions  Special attention given to:        Regular exercise       Healthy diet/nutrition       Colon cancer screening    BMI: Body mass index is 31.45 kg/m .  Weight  management plan: Discussed healthy diet and exercise guidelines    ASSESSMENT:  45 year old female with satisfactory annual exam.    ICD-10-CM    1. Encounter for gynecological examination without abnormal finding  Z01.419    2. Urinary frequency  R35.0 UA with Microscopic - lab collect     Urine Culture Aerobic Bacterial - lab collect     UA with Microscopic - lab collect     Urine Microscopic Exam     nitroFURantoin macrocrystal-monohydrate (MACROBID) 100 MG capsule     Urine Culture Aerobic Bacterial - lab collect   3. Herpes simplex vulvovaginitis  A60.04 valACYclovir (VALTREX) 500 MG tablet   4. Screening for thyroid disorder  Z13.29 TSH with free T4 reflex     TSH with free T4 reflex   5. Other depression  F32.89 venlafaxine (EFFEXOR) 37.5 MG tablet   6. Screening for diabetes mellitus  Z13.1 Glucose whole blood     Glucose whole blood   7. Perimenopausal vasomotor symptoms  N95.1 CBC with platelets     Follicle stimulating hormone     CBC with platelets     Follicle stimulating hormone   8. Screening cholesterol level  Z13.220 Lipid panel reflex to direct LDL Fasting     Lipid panel reflex to direct LDL Fasting   9. Colon cancer screening  Z12.11 Adult Gastro Ref - Procedure Only       PLAN:  1. Pap due in 2023  2. Colonscopy due at the end of this year  3. Discussed mammogram screening guidelines.  At this time patient prefers every other year.  4. UTI: UA is suspicious. Will send for culture.  Prescription for macrobid sent.  5. I reviewed with patient her vasomotor symptoms.  We discussed how hormonal changes contribute to vasomotor symptoms.  We reviewed hormone replacement therapy in on their nonhormonal medication options for treatment of vasomotor symptoms. Patient would like to try Effexor to help with both mood and vasomotor symptoms.  We discussed that this will take several weeks to see if it is effective  Additionally will check CBC, FSH, and TSH  6. Lipid and glucose drawn today as  well.  Follow-up in 1 year for annual and sooner as needed.    (15 additional minutes were spent on the date of the encounter doing chart review, review of outside records, review and interpretation of pertinent test results, history and exam, documentation, patient counseling, and further activities as noted above in addition to typical time spent for preventative women's health exam.)      Liana Rasheed MD

## 2021-09-19 ENCOUNTER — HEALTH MAINTENANCE LETTER (OUTPATIENT)
Age: 46
End: 2021-09-19

## 2021-09-29 ENCOUNTER — OFFICE VISIT (OUTPATIENT)
Dept: OBGYN | Facility: CLINIC | Age: 46
End: 2021-09-29
Payer: COMMERCIAL

## 2021-09-29 VITALS
SYSTOLIC BLOOD PRESSURE: 130 MMHG | DIASTOLIC BLOOD PRESSURE: 74 MMHG | HEART RATE: 68 BPM | HEIGHT: 70 IN | BODY MASS INDEX: 31.38 KG/M2 | WEIGHT: 219.2 LBS

## 2021-09-29 DIAGNOSIS — A60.04 HERPES SIMPLEX VULVOVAGINITIS: ICD-10-CM

## 2021-09-29 DIAGNOSIS — R35.0 URINARY FREQUENCY: ICD-10-CM

## 2021-09-29 DIAGNOSIS — F32.89 OTHER DEPRESSION: ICD-10-CM

## 2021-09-29 DIAGNOSIS — N95.1 PERIMENOPAUSAL VASOMOTOR SYMPTOMS: ICD-10-CM

## 2021-09-29 DIAGNOSIS — Z13.29 SCREENING FOR THYROID DISORDER: ICD-10-CM

## 2021-09-29 DIAGNOSIS — Z12.11 COLON CANCER SCREENING: ICD-10-CM

## 2021-09-29 DIAGNOSIS — Z13.1 SCREENING FOR DIABETES MELLITUS: ICD-10-CM

## 2021-09-29 DIAGNOSIS — Z13.220 SCREENING CHOLESTEROL LEVEL: ICD-10-CM

## 2021-09-29 DIAGNOSIS — Z01.419 ENCOUNTER FOR GYNECOLOGICAL EXAMINATION WITHOUT ABNORMAL FINDING: Primary | ICD-10-CM

## 2021-09-29 LAB
ALBUMIN UR-MCNC: NEGATIVE MG/DL
APPEARANCE UR: ABNORMAL
BACTERIA #/AREA URNS HPF: ABNORMAL /HPF
BILIRUB UR QL STRIP: NEGATIVE
COLOR UR AUTO: YELLOW
ERYTHROCYTE [DISTWIDTH] IN BLOOD BY AUTOMATED COUNT: 13.1 % (ref 10–15)
GLUCOSE BLD-MCNC: 83 MG/DL (ref 79–116)
GLUCOSE UR STRIP-MCNC: NEGATIVE MG/DL
HCT VFR BLD AUTO: 39.3 % (ref 35–47)
HGB BLD-MCNC: 12.6 G/DL (ref 11.7–15.7)
HGB UR QL STRIP: NEGATIVE
KETONES UR STRIP-MCNC: ABNORMAL MG/DL
LEUKOCYTE ESTERASE UR QL STRIP: ABNORMAL
MCH RBC QN AUTO: 27.5 PG (ref 26.5–33)
MCHC RBC AUTO-ENTMCNC: 32.1 G/DL (ref 31.5–36.5)
MCV RBC AUTO: 86 FL (ref 78–100)
MUCOUS THREADS #/AREA URNS LPF: PRESENT /LPF
NITRATE UR QL: NEGATIVE
PH UR STRIP: 5 [PH] (ref 5–7)
PLATELET # BLD AUTO: 213 10E3/UL (ref 150–450)
RBC # BLD AUTO: 4.59 10E6/UL (ref 3.8–5.2)
RBC #/AREA URNS AUTO: ABNORMAL /HPF
SP GR UR STRIP: >=1.03 (ref 1–1.03)
SQUAMOUS #/AREA URNS AUTO: ABNORMAL /LPF
UROBILINOGEN UR STRIP-ACNC: 0.2 E.U./DL
WBC # BLD AUTO: 11.5 10E3/UL (ref 4–11)
WBC #/AREA URNS AUTO: ABNORMAL /HPF

## 2021-09-29 PROCEDURE — 36415 COLL VENOUS BLD VENIPUNCTURE: CPT | Performed by: OBSTETRICS & GYNECOLOGY

## 2021-09-29 PROCEDURE — 81001 URINALYSIS AUTO W/SCOPE: CPT | Performed by: OBSTETRICS & GYNECOLOGY

## 2021-09-29 PROCEDURE — 83001 ASSAY OF GONADOTROPIN (FSH): CPT | Performed by: OBSTETRICS & GYNECOLOGY

## 2021-09-29 PROCEDURE — 99213 OFFICE O/P EST LOW 20 MIN: CPT | Mod: 25 | Performed by: OBSTETRICS & GYNECOLOGY

## 2021-09-29 PROCEDURE — 87186 SC STD MICRODIL/AGAR DIL: CPT | Performed by: OBSTETRICS & GYNECOLOGY

## 2021-09-29 PROCEDURE — 99396 PREV VISIT EST AGE 40-64: CPT | Performed by: OBSTETRICS & GYNECOLOGY

## 2021-09-29 PROCEDURE — 80061 LIPID PANEL: CPT | Performed by: OBSTETRICS & GYNECOLOGY

## 2021-09-29 PROCEDURE — 84443 ASSAY THYROID STIM HORMONE: CPT | Performed by: OBSTETRICS & GYNECOLOGY

## 2021-09-29 PROCEDURE — 82947 ASSAY GLUCOSE BLOOD QUANT: CPT | Performed by: OBSTETRICS & GYNECOLOGY

## 2021-09-29 PROCEDURE — 87086 URINE CULTURE/COLONY COUNT: CPT | Performed by: OBSTETRICS & GYNECOLOGY

## 2021-09-29 PROCEDURE — 85027 COMPLETE CBC AUTOMATED: CPT | Performed by: OBSTETRICS & GYNECOLOGY

## 2021-09-29 RX ORDER — VALACYCLOVIR HYDROCHLORIDE 500 MG/1
500 TABLET, FILM COATED ORAL 2 TIMES DAILY
Qty: 30 TABLET | Refills: 3 | Status: SHIPPED | OUTPATIENT
Start: 2021-09-29 | End: 2023-05-02

## 2021-09-29 RX ORDER — NITROFURANTOIN 25; 75 MG/1; MG/1
100 CAPSULE ORAL 2 TIMES DAILY
Qty: 14 CAPSULE | Refills: 0 | Status: SHIPPED | OUTPATIENT
Start: 2021-09-29 | End: 2021-10-06

## 2021-09-29 RX ORDER — VENLAFAXINE 37.5 MG/1
37.5 TABLET ORAL 2 TIMES DAILY
Qty: 60 TABLET | Refills: 0 | Status: SHIPPED | OUTPATIENT
Start: 2021-09-29 | End: 2021-10-28

## 2021-09-29 ASSESSMENT — ANXIETY QUESTIONNAIRES
5. BEING SO RESTLESS THAT IT IS HARD TO SIT STILL: NOT AT ALL
2. NOT BEING ABLE TO STOP OR CONTROL WORRYING: SEVERAL DAYS
GAD7 TOTAL SCORE: 5
1. FEELING NERVOUS, ANXIOUS, OR ON EDGE: SEVERAL DAYS
IF YOU CHECKED OFF ANY PROBLEMS ON THIS QUESTIONNAIRE, HOW DIFFICULT HAVE THESE PROBLEMS MADE IT FOR YOU TO DO YOUR WORK, TAKE CARE OF THINGS AT HOME, OR GET ALONG WITH OTHER PEOPLE: SOMEWHAT DIFFICULT
7. FEELING AFRAID AS IF SOMETHING AWFUL MIGHT HAPPEN: NOT AT ALL
6. BECOMING EASILY ANNOYED OR IRRITABLE: SEVERAL DAYS
3. WORRYING TOO MUCH ABOUT DIFFERENT THINGS: SEVERAL DAYS

## 2021-09-29 ASSESSMENT — MIFFLIN-ST. JEOR: SCORE: 1724.53

## 2021-09-29 ASSESSMENT — PATIENT HEALTH QUESTIONNAIRE - PHQ9
5. POOR APPETITE OR OVEREATING: SEVERAL DAYS
SUM OF ALL RESPONSES TO PHQ QUESTIONS 1-9: 6

## 2021-09-30 LAB
CHOLEST SERPL-MCNC: 218 MG/DL
FASTING STATUS PATIENT QL REPORTED: YES
FSH SERPL-ACNC: 90.1 IU/L
HDLC SERPL-MCNC: 45 MG/DL
LDLC SERPL CALC-MCNC: 153 MG/DL
NONHDLC SERPL-MCNC: 173 MG/DL
TRIGL SERPL-MCNC: 101 MG/DL
TSH SERPL DL<=0.005 MIU/L-ACNC: 1.97 MU/L (ref 0.4–4)

## 2021-09-30 ASSESSMENT — ANXIETY QUESTIONNAIRES: GAD7 TOTAL SCORE: 5

## 2021-10-01 LAB
BACTERIA UR CULT: ABNORMAL
BACTERIA UR CULT: ABNORMAL

## 2021-10-14 DIAGNOSIS — F32.89 OTHER DEPRESSION: ICD-10-CM

## 2021-10-14 RX ORDER — CITALOPRAM HYDROBROMIDE 40 MG/1
TABLET ORAL
Qty: 30 TABLET | Refills: 0 | OUTPATIENT
Start: 2021-10-14

## 2021-10-14 NOTE — TELEPHONE ENCOUNTER
"Refill request refused due to :   [] Refills available at pharmacy.  Request sent back to pharmacy as \"duplicate\"  [] Patient report no longer needing it  [x] Medication discontinued.  Patient reported not taking on 9/29/21        Kavita Damon RN on 10/14/2021 at 10:13 AM    "

## 2021-10-14 NOTE — TELEPHONE ENCOUNTER
"Requested Prescriptions   Pending Prescriptions Disp Refills     citalopram (CELEXA) 40 MG tablet [Pharmacy Med Name: CITALOPRAM 40MG TABLETS] 30 tablet 0     Sig: TAKE 1 TABLET(40 MG) BY MOUTH DAILY       SSRIs Protocol Failed - 10/14/2021  9:22 AM        Failed - PHQ-9 score less than 5 in past 6 months     Please review last PHQ-9 score.           Passed - Medication is active on med list        Passed - Patient is age 18 or older        Passed - No active pregnancy on record        Passed - No positive pregnancy test in last 12 months        Passed - Recent (6 mo) or future (30 days) visit within the authorizing provider's specialty     Patient had office visit in the last 6 months or has a visit in the next 30 days with authorizing provider or within the authorizing provider's specialty.  See \"Patient Info\" tab in inbasket, or \"Choose Columns\" in Meds & Orders section of the refill encounter.               Last Written Prescription Date:  09/09/21  Last Fill Quantity: 30,  # refills: 0   Last office visit: 9/29/2021 with prescribing provider:  Behzad   Future Office Visit:  None found          "

## 2021-10-28 ENCOUNTER — MYC MEDICAL ADVICE (OUTPATIENT)
Dept: OBGYN | Facility: CLINIC | Age: 46
End: 2021-10-28

## 2021-10-28 DIAGNOSIS — F32.89 OTHER DEPRESSION: ICD-10-CM

## 2021-10-28 RX ORDER — VENLAFAXINE 37.5 MG/1
37.5 TABLET ORAL 2 TIMES DAILY
Qty: 60 TABLET | Refills: 0 | Status: SHIPPED | OUTPATIENT
Start: 2021-10-28 | End: 2021-12-09

## 2021-10-28 NOTE — TELEPHONE ENCOUNTER
Requested Prescriptions   Pending Prescriptions Disp Refills     venlafaxine (EFFEXOR) 37.5 MG tablet 60 tablet 0     Sig: Take 1 tablet (37.5 mg) by mouth 2 times daily       There is no refill protocol information for this order        Last Written Prescription Date:  9/29/21  Last Fill Quantity: 60,  # refills: 0   Last office visit: 9/29/2021 with prescribing provider:  Dr Rasheed   Future Office Visit: None

## 2021-10-28 NOTE — TELEPHONE ENCOUNTER
PLAN:  1. Pap due in 2023  2. Colonscopy due at the end of this year  3. Discussed mammogram screening guidelines.  At this time patient prefers every other year.  4. UTI: UA is suspicious. Will send for culture.  Prescription for macrobid sent.  5. I reviewed with patient her vasomotor symptoms.  We discussed how hormonal changes contribute to vasomotor symptoms.  We reviewed hormone replacement therapy in on their nonhormonal medication options for treatment of vasomotor symptoms. Patient would like to try Effexor to help with both mood and vasomotor symptoms.  We discussed that this will take several weeks to see if it is effective  Additionally will check CBC, FSH, and TSH  6. Lipid and glucose drawn today as well.  Follow-up in 1 year for annual and sooner as needed.    Attempted call.  LMTCB to discuss the new medication and how it has been working for her.  [place] [employer] message sent as well.  Kavita Damon RN on 10/28/2021 at 9:16 AM

## 2021-10-28 NOTE — TELEPHONE ENCOUNTER
[facility]  [facility]  to Kavita Damon RN    KB      10/28/21 9:36 AM  Yes, I m glad I started this med. doing a lot better, barley any depression symptoms and hot flashes do seem to be less. This dosage also seems to the correct amount. Thanks      Routing to provider - want to send more refills? F/U visit?    Clarissa Jason RN on 10/28/2021 at 9:53 AM

## 2021-12-08 DIAGNOSIS — F32.89 OTHER DEPRESSION: ICD-10-CM

## 2021-12-09 RX ORDER — VENLAFAXINE 37.5 MG/1
37.5 TABLET ORAL 2 TIMES DAILY
Qty: 60 TABLET | Refills: 9 | Status: SHIPPED | OUTPATIENT
Start: 2021-12-09

## 2021-12-09 NOTE — TELEPHONE ENCOUNTER
"Requested Prescriptions   Pending Prescriptions Disp Refills     venlafaxine (EFFEXOR) 37.5 MG tablet 60 tablet 0     Sig: Take 1 tablet (37.5 mg) by mouth 2 times daily       Serotonin-Norepinephrine Reuptake Inhibitors  Failed - 12/8/2021  6:04 PM        Failed - PHQ-9 score of less than 5 in past 6 months     Please review last PHQ-9 score.           Failed - Normal serum creatinine on file in past 12 months     No lab results found.    Ok to refill medication if creatinine is low          Passed - Blood pressure under 140/90 in past 12 months     BP Readings from Last 3 Encounters:   09/29/21 130/74   03/17/21 120/80   09/24/20 120/78                 Passed - Medication is active on med list        Passed - Patient is age 18 or older        Passed - No active pregnancy on record        Passed - No positive pregnancy test in past 12 months        Passed - Recent (6 mo) or future (30 days) visit within the authorizing provider's specialty     Patient had office visit in the last 6 months or has a visit in the next 30 days with authorizing provider or within the authorizing provider's specialty.  See \"Patient Info\" tab in inbasket, or \"Choose Columns\" in Meds & Orders section of the refill encounter.               Last Written Prescription Date:  10/28/21  Last Fill Quantity: 60,  # refills: 0   Last office visit: 9/29/2021 with prescribing provider:  Dr Liana Rasheed   Future Office Visit:  none          "

## 2021-12-09 NOTE — TELEPHONE ENCOUNTER
5. I reviewed with patient her vasomotor symptoms.  We discussed how hormonal changes contribute to vasomotor symptoms.  We reviewed hormone replacement therapy in on their nonhormonal medication options for treatment of vasomotor symptoms. Patient would like to try Effexor to help with both mood and vasomotor symptoms.  We discussed that this will take several weeks to see if it is effective.    10/28/21 9:36 AM from patient  Yes, I m glad I started this med. doing a lot better, barley any depression symptoms and hot flashes do seem to be less. This dosage also seems to the correct amount. Thanks    Prescription approved per [facility] Refill Protocol.  Kavita Damon RN on 12/9/2021 at 5:55 AM

## 2022-01-09 ENCOUNTER — HEALTH MAINTENANCE LETTER (OUTPATIENT)
Age: 47
End: 2022-01-09

## 2022-11-21 ENCOUNTER — HEALTH MAINTENANCE LETTER (OUTPATIENT)
Age: 47
End: 2022-11-21

## 2023-01-09 DIAGNOSIS — F32.89 OTHER DEPRESSION: ICD-10-CM

## 2023-01-09 NOTE — TELEPHONE ENCOUNTER
"Requested Prescriptions   Pending Prescriptions Disp Refills     venlafaxine (EFFEXOR) 37.5 MG tablet 60 tablet 9     Sig: Take 1 tablet (37.5 mg) by mouth 2 times daily       Serotonin-Norepinephrine Reuptake Inhibitors  Failed - 1/9/2023 11:47 AM        Failed - Blood pressure under 140/90 in past 12 months     BP Readings from Last 3 Encounters:   09/29/21 130/74   03/17/21 120/80   09/24/20 120/78                 Failed - PHQ-9 score of less than 5 in past 6 months     Please review last PHQ-9 score.           Failed - Normal serum creatinine on file in past 12 months     No lab results found.    Ok to refill medication if creatinine is low          Failed - Recent (6 mo) or future (30 days) visit within the authorizing provider's specialty     Patient had office visit in the last 6 months or has a visit in the next 30 days with authorizing provider or within the authorizing provider's specialty.  See \"Patient Info\" tab in inbasket, or \"Choose Columns\" in Meds & Orders section of the refill encounter.            Passed - Medication is active on med list        Passed - Patient is age 18 or older        Passed - No active pregnancy on record        Passed - No positive pregnancy test in past 12 months             Last Written Prescription Date:  12/9/2021  Last Fill Quantity: 60,  # refills: 9   Last office visit: 9/29/2021 with prescribing provider:  Wili   Future Office Visit:  none          "

## 2023-01-10 RX ORDER — VENLAFAXINE 37.5 MG/1
37.5 TABLET ORAL 2 TIMES DAILY
Qty: 60 TABLET | Refills: 9 | OUTPATIENT
Start: 2023-01-10

## 2023-01-10 NOTE — TELEPHONE ENCOUNTER
Pt due for annual, no appt scheduled. Pt already received one month extension. Rx denied.   Clarissa Jason RN on 1/10/2023 at 8:42 AM

## 2023-03-01 NOTE — PROGRESS NOTES
Nga is a 47 year old  female who presents for annual exam.     Besides routine health maintenance,  she would like to discuss menopausal symptoms and a natural way to deal with the symptoms.    HPI:  The patient's PCP is  No primary care provider on file.  Patient presents today for annual well woman visit and exam with concerns of menopausal symptoms. Reports she has not had a period in a year, LMP 3/10/2022. Has been dealing with (ruben)menopausal symptoms for 8 years. Has tried many natural remedies for symptom relief such as exercise, diet, vitamins, and herbal supplements without adequate relief. Was started on Effexor in 2021 for VSM and mood changes. Reports at that time she was dealing with a lot emotionally, as her son was diagnosed with bone cancer. Endorses Effexor did not help with the hot flashes and night sweats. Recently, she slowly tapered herself off of the Effexor. Has not taken for the past 4 days. States she is still having bothersome menopausal symptoms such as hot flashes that occur every other hour, insomnia, fatigue, brain fog, and vaginal dryness. Is hoping to try what is most natural and effective. Endorses excellent exercise and diet regimen. Declines lipid screening today. No other concerns today. Patient agreeable to have student perform exam component of today's visit.      GYNECOLOGIC HISTORY:    Patient's last menstrual period was 03/10/2022 (approximate).    Regular menses? no  Her current contraception method is: essure.  She  reports that she has never smoked. She has never used smokeless tobacco.  Patient is sexually active.  STD testing offered?  Declined  Last PHQ-9 score on record =   PHQ-9 SCORE 3/2/2023   PHQ-9 Total Score 2     Last GAD7 score on record =   ACOSTA-7 SCORE 3/2/2023   Total Score 1     Alcohol Score = 1    HEALTH MAINTENANCE:  Cholesterol:   Recent Labs   Lab Test 21  1521   CHOL 218*   HDL 45*   *   TRIG 101     Last Mammo: 20,  Result: diagnostic, benign, Next Mammo: Today   Pap:   Lab Results   Component Value Date    PAP NIL/HPV- 2018      Colonoscopy: 2022 Cologuard last 4 mths Result: negative,  Next Colonoscopy: 3yrs  Dexa:  N/A    Health maintenance updated:  yes    HISTORY:  OB History    Para Term  AB Living   4 4 4 0 0 4   SAB IAB Ectopic Multiple Live Births   0 0 0 0 4      # Outcome Date GA Lbr Hussein/2nd Weight Sex Delivery Anes PTL Lv   4 Term 08    F    ROBYN      Name: Leah   3 Term 10/21/03 40w0d 08:00 3.856 kg (8 lb 8 oz) F    ROBYN      Birth Comments: No complications in preg or with delivery.  Baby born healthy.  Wt gain of 25#.      Name: Coreen   2 Term 06/10/96 41w0d 06:00 3.969 kg (8 lb 12 oz) M Vag-Spont   ROBYN      Birth Comments: No complications in preg or with delivery.  Baby born healthy.  Wt gain of 25#.      Name: Herbert Addison Term 94 40w0d 15:00 3.714 kg (8 lb 3 oz) M Vag-Forceps   ROBYN      Birth Comments: Forceps and Vaccum used.  Hyperemesis.  No other complications.        Name: Benjamin       Patient Active Problem List   Diagnosis     Cervical cancer screening     Past Surgical History:   Procedure Laterality Date     AS HYSTEROS W PERMANENT FALLOPAIN IMPLANT       GYN SURGERY      4 vaginal deliveries     MAMMOPLASTY REDUCTION BILATERAL Bilateral 2016    Procedure: MAMMOPLASTY REDUCTION BILATERAL;  Surgeon: Andrew Craig MD;  Location: [facility]     ORTHOPEDIC SURGERY        Social History     Tobacco Use     Smoking status: Never     Smokeless tobacco: Never   Substance Use Topics     Alcohol use: Yes     Comment: 1-2/week      Problem (# of Occurrences) Relation (Name,Age of Onset)    Diabetes (1) Father    Heart Disease (1) Mother    Hypertension (2) Mother, Father    Colon Cancer (1) Maternal Grandmother    Leukemia (1) Brother    No Known Problems (5) Maternal Grandfather, Paternal Grandmother, Paternal Grandfather, Sister, Other            Current Outpatient  "Medications   Medication Sig     Ascorbic Acid (VITAMIN C PO)      magnesium 250 MG tablet Take 1 tablet by mouth daily     Multiple Vitamin (MULTIVITAMIN PO)      Multiple Vitamins-Minerals (ZINC PO)      Vitamin D3 (CHOLECALCIFEROL) 25 mcg (1000 units) tablet Take by mouth daily     ACYCLOVIR PO Take 800 mg by mouth 2 times daily (Patient not taking: Reported on 9/29/2021)     BLACK CURRANT SEED OIL PO  (Patient not taking: Reported on 3/2/2023)     citalopram (CELEXA) 40 MG tablet TAKE 1 TABLET(40 MG) BY MOUTH DAILY (Patient not taking: No sig reported)     EVENING PRIMROSE OIL PO  (Patient not taking: Reported on 3/2/2023)     Flaxseed, Linseed, (FLAXSEED OIL PO)  (Patient not taking: Reported on 3/2/2023)     fluticasone (FLONASE) 50 MCG/ACT spray Spray 2 sprays into both nostrils daily (Patient not taking: Reported on 7/15/2020)     Moringa Oleifera (MORINGA PO)  (Patient not taking: Reported on 3/2/2023)     Pyridoxine HCl (B-6 PO)  (Patient not taking: Reported on 3/2/2023)     TURMERIC PO  (Patient not taking: Reported on 3/2/2023)     valACYclovir (VALTREX) 500 MG tablet Take 1 tablet (500 mg) by mouth 2 times daily (Patient not taking: Reported on 3/2/2023)     venlafaxine (EFFEXOR) 37.5 MG tablet Take 1 tablet (37.5 mg) by mouth 2 times daily (Patient not taking: Reported on 3/2/2023)     VITAMIN E PO  (Patient not taking: Reported on 3/2/2023)     No current facility-administered medications for this visit.     Allergies   Allergen Reactions     Cats Itching       Past medical, surgical, social and family histories were reviewed and updated in EPIC.    ROS:   12 point review of systems negative other than symptoms noted below or in the HPI.  No urinary frequency or dysuria, bladder or kidney problems    EXAM:  BP 98/62   Ht 1.765 m (5' 9.5\")   Wt 76.4 kg (168 lb 6.4 oz)   LMP 03/10/2022 (Approximate)   BMI 24.51 kg/m     BMI: Body mass index is 24.51 kg/m .    PHYSICAL EXAM:  Constitutional: "   Appearance: Well nourished, well developed, alert, in no acute distress  Neck:  Lymph Nodes:  No lymphadenopathy present    Thyroid:  Gland size normal, nontender, no nodules or masses present  on palpation  Chest:  Respiratory Effort:  Breathing unlabored  Cardiovascular:    Heart: Auscultation:  Regular rate, normal rhythm, no murmurs present  Breasts: Inspection of Breasts:  No lymphadenopathy present., Palpation of Breasts and Axillae:  No masses present on palpation, no breast tenderness., Axillary Lymph Nodes:  No lymphadenopathy present. and No nodularity, asymmetry or nipple discharge bilaterally.  Gastrointestinal:   Abdominal Examination:  Abdomen nontender to palpation, tone normal without rigidity or guarding, no masses present, umbilicus without lesions   Liver and Spleen:  No hepatomegaly present, liver nontender to palpation    Hernias:  No hernias present  Lymphatic: Lymph Nodes:  No other lymphadenopathy present  Skin:  General Inspection:  No rashes present, no lesions present, no areas of  discoloration  Neurologic:    Mental Status:  Oriented X3.  Normal strength and tone, sensory exam                grossly normal, mentation intact and speech normal.    Psychiatric:   Mentation appears normal and affect normal/bright.         Pelvic Exam:  External Genitalia:     Normal appearance for age, no discharge present, no tenderness present, no inflammatory lesions present, color normal  Vagina:     Normal vaginal vault without central or paravaginal defects, no discharge present, no inflammatory lesions present, no masses present  Bladder:     Nontender to palpation  Urethra:   Urethral Body:  Urethra palpation normal, urethra structural support normal   Urethral Meatus:  No erythema or lesions present  Cervix:     Appearance healthy, no lesions present, nontender to palpation, no bleeding present. Pap collected. Small nodule palpated on cervix at the 3 o'clock position. Not visually notable with  speculum exam.   Uterus:     Uterus: firm, normal sized and nontender, midplane in position.   Adnexa:     No adnexal tenderness present, no adnexal masses present  Perineum:     Perineum within normal limits, no evidence of trauma, no rashes or skin lesions present  Anus:     Anus within normal limits, no hemorrhoids present  Inguinal Lymph Nodes:     No lymphadenopathy present  Pubic Hair:     Normal pubic hair distribution for age  Genitalia and Groin:     No rashes present, no lesions present, no areas of discoloration, no masses present      COUNSELING:   Reviewed preventive health counseling, as reflected in patient instructions  Special attention given to:        Regular exercise       Healthy diet/nutrition       Osteoporosis prevention/bone health       Colorectal Cancer Screening       (Gretchen)menopause management    BMI: Body mass index is 24.51 kg/m .      ASSESSMENT:  47 year old female with satisfactory annual exam.    ICD-10-CM    1. Symptomatic menopausal or female climacteric states  N95.1 Follicle stimulating hormone     Estradiol     Progesterone     estradiol (ESTRACE) 0.5 MG tablet     medroxyPROGESTERone (PROVERA) 2.5 MG tablet      2. Low vitamin D level  R79.89 Vitamin D Deficiency      3. Encounter for gynecological examination without abnormal finding  Z01.419 Pap thin layer screen with HPV - recommended age 30 - 65 years          PLAN:  Pap collected with gyn exam. Small ridge palpated on cervix at 3 o'clock position, but not visualized. Likely due to multigravida. Will notify patient of pap results once available. Patient requesting estradiol, progesterone and FSH to be collected. Declines lipid panel. Ordered PO HRT for menopausal symptoms. Instructed patient to not start HRT until lab work is back and discussed. follow-up on symptom relief in 2 months.   RTC prn or annually for well woman visit.    INavya, completed the PFSH and ROS. I then acted as a scribe for Tiffany  ANTONINO Laurent CNP for the remainder of the visit.  Navya SALEEM, RN  [facility] DNP, WHNP student    I was present with the student who participated in the service and in the documentation of the note. I have verified the history and medical decision-making.  Student participated in the annual exam and I can attest to being personally present for the entire exam. I agree with the assessment and plan of care as documented in the note. ANTONINO French CNP, APRN CNP

## 2023-03-02 ENCOUNTER — ANCILLARY PROCEDURE (OUTPATIENT)
Dept: MAMMOGRAPHY | Facility: CLINIC | Age: 48
End: 2023-03-02
Payer: COMMERCIAL

## 2023-03-02 ENCOUNTER — OFFICE VISIT (OUTPATIENT)
Dept: OBGYN | Facility: CLINIC | Age: 48
End: 2023-03-02
Payer: COMMERCIAL

## 2023-03-02 VITALS
DIASTOLIC BLOOD PRESSURE: 62 MMHG | SYSTOLIC BLOOD PRESSURE: 98 MMHG | HEIGHT: 70 IN | BODY MASS INDEX: 24.11 KG/M2 | WEIGHT: 168.4 LBS

## 2023-03-02 DIAGNOSIS — Z01.419 ENCOUNTER FOR GYNECOLOGICAL EXAMINATION WITHOUT ABNORMAL FINDING: ICD-10-CM

## 2023-03-02 DIAGNOSIS — Z12.31 VISIT FOR SCREENING MAMMOGRAM: ICD-10-CM

## 2023-03-02 DIAGNOSIS — R79.89 LOW VITAMIN D LEVEL: ICD-10-CM

## 2023-03-02 DIAGNOSIS — N95.1 SYMPTOMATIC MENOPAUSAL OR FEMALE CLIMACTERIC STATES: Primary | ICD-10-CM

## 2023-03-02 LAB
ESTRADIOL SERPL-MCNC: <5 PG/ML
FSH SERPL IRP2-ACNC: 114 MIU/ML
PROGEST SERPL-MCNC: 0.2 NG/ML

## 2023-03-02 PROCEDURE — 36415 COLL VENOUS BLD VENIPUNCTURE: CPT | Performed by: NURSE PRACTITIONER

## 2023-03-02 PROCEDURE — 99396 PREV VISIT EST AGE 40-64: CPT | Performed by: NURSE PRACTITIONER

## 2023-03-02 PROCEDURE — 87624 HPV HI-RISK TYP POOLED RSLT: CPT | Performed by: NURSE PRACTITIONER

## 2023-03-02 PROCEDURE — 82306 VITAMIN D 25 HYDROXY: CPT | Performed by: NURSE PRACTITIONER

## 2023-03-02 PROCEDURE — 77067 SCR MAMMO BI INCL CAD: CPT | Mod: TC | Performed by: RADIOLOGY

## 2023-03-02 PROCEDURE — 83001 ASSAY OF GONADOTROPIN (FSH): CPT | Performed by: NURSE PRACTITIONER

## 2023-03-02 PROCEDURE — 99213 OFFICE O/P EST LOW 20 MIN: CPT | Mod: 25 | Performed by: NURSE PRACTITIONER

## 2023-03-02 PROCEDURE — 84144 ASSAY OF PROGESTERONE: CPT | Performed by: NURSE PRACTITIONER

## 2023-03-02 PROCEDURE — G0145 SCR C/V CYTO,THINLAYER,RESCR: HCPCS | Performed by: NURSE PRACTITIONER

## 2023-03-02 PROCEDURE — 77063 BREAST TOMOSYNTHESIS BI: CPT | Mod: TC | Performed by: RADIOLOGY

## 2023-03-02 PROCEDURE — 82670 ASSAY OF TOTAL ESTRADIOL: CPT | Performed by: NURSE PRACTITIONER

## 2023-03-02 RX ORDER — MULTIVITAMIN WITH IRON
1 TABLET ORAL DAILY
COMMUNITY

## 2023-03-02 RX ORDER — VITAMIN B COMPLEX
TABLET ORAL DAILY
COMMUNITY

## 2023-03-02 RX ORDER — ESTRADIOL 0.5 MG/1
0.5 TABLET ORAL DAILY
Qty: 90 TABLET | Refills: 1 | Status: SHIPPED | OUTPATIENT
Start: 2023-03-02

## 2023-03-02 RX ORDER — MEDROXYPROGESTERONE ACETATE 2.5 MG/1
2.5 TABLET ORAL DAILY
Qty: 90 TABLET | Refills: 1 | Status: SHIPPED | OUTPATIENT
Start: 2023-03-02

## 2023-03-02 ASSESSMENT — PATIENT HEALTH QUESTIONNAIRE - PHQ9
SUM OF ALL RESPONSES TO PHQ QUESTIONS 1-9: 2
5. POOR APPETITE OR OVEREATING: NOT AT ALL

## 2023-03-02 ASSESSMENT — ANXIETY QUESTIONNAIRES
2. NOT BEING ABLE TO STOP OR CONTROL WORRYING: NOT AT ALL
6. BECOMING EASILY ANNOYED OR IRRITABLE: NOT AT ALL
IF YOU CHECKED OFF ANY PROBLEMS ON THIS QUESTIONNAIRE, HOW DIFFICULT HAVE THESE PROBLEMS MADE IT FOR YOU TO DO YOUR WORK, TAKE CARE OF THINGS AT HOME, OR GET ALONG WITH OTHER PEOPLE: NOT DIFFICULT AT ALL
GAD7 TOTAL SCORE: 1
1. FEELING NERVOUS, ANXIOUS, OR ON EDGE: SEVERAL DAYS
GAD7 TOTAL SCORE: 1
3. WORRYING TOO MUCH ABOUT DIFFERENT THINGS: NOT AT ALL
5. BEING SO RESTLESS THAT IT IS HARD TO SIT STILL: NOT AT ALL
7. FEELING AFRAID AS IF SOMETHING AWFUL MIGHT HAPPEN: NOT AT ALL

## 2023-03-04 LAB — DEPRECATED CALCIDIOL+CALCIFEROL SERPL-MC: 65 UG/L (ref 20–75)

## 2023-03-06 LAB
BKR LAB AP GYN ADEQUACY: NORMAL
BKR LAB AP GYN INTERPRETATION: NORMAL
BKR LAB AP HPV REFLEX: NORMAL
BKR LAB AP PREVIOUS ABNORMAL: NORMAL
PATH REPORT.COMMENTS IMP SPEC: NORMAL
PATH REPORT.COMMENTS IMP SPEC: NORMAL
PATH REPORT.RELEVANT HX SPEC: NORMAL

## 2023-03-08 LAB
HUMAN PAPILLOMA VIRUS 16 DNA: NEGATIVE
HUMAN PAPILLOMA VIRUS 18 DNA: NEGATIVE
HUMAN PAPILLOMA VIRUS FINAL DIAGNOSIS: NORMAL
HUMAN PAPILLOMA VIRUS OTHER HR: NEGATIVE

## 2023-03-10 PROBLEM — Z12.4 CERVICAL CANCER SCREENING: Status: ACTIVE | Noted: 2018-05-14

## 2023-05-02 ENCOUNTER — TELEPHONE (OUTPATIENT)
Dept: OBGYN | Facility: CLINIC | Age: 48
End: 2023-05-02
Payer: COMMERCIAL

## 2023-05-02 DIAGNOSIS — A60.04 HERPES SIMPLEX VULVOVAGINITIS: ICD-10-CM

## 2023-05-02 RX ORDER — VALACYCLOVIR HYDROCHLORIDE 500 MG/1
500 TABLET, FILM COATED ORAL 2 TIMES DAILY
Qty: 30 TABLET | Refills: 3 | Status: SHIPPED | OUTPATIENT
Start: 2023-05-02

## 2024-04-13 ENCOUNTER — HEALTH MAINTENANCE LETTER (OUTPATIENT)
Age: 49
End: 2024-04-13

## 2024-12-09 DIAGNOSIS — A60.04 HERPES SIMPLEX VULVOVAGINITIS: ICD-10-CM

## 2024-12-09 RX ORDER — VALACYCLOVIR HYDROCHLORIDE 500 MG/1
500 TABLET, FILM COATED ORAL 2 TIMES DAILY
Qty: 30 TABLET | Refills: 3 | OUTPATIENT
Start: 2024-12-09

## 2024-12-09 NOTE — TELEPHONE ENCOUNTER
Requested Prescriptions   Pending Prescriptions Disp Refills    valACYclovir (VALTREX) 500 MG tablet [Pharmacy Med Name: VALACYCLOVIR 500MG TABLETS] 30 tablet 3     Sig: TAKE 1 TABLET(500 MG) BY MOUTH TWICE DAILY       Antivirals Failed - 12/9/2024  4:49 PM        Failed - Recent (12 mo) or future (90 days) visit within the authorizing provider's specialty     The patient must have completed an in-person or virtual visit within the past 12 months or has a future visit scheduled within the next 90 days with the authorizing provider s specialty.  Urgent care and e-visits do not qualify as an office visit for this protocol.          Passed - Patient is age 12 or older        Passed - Medication is active on med list        Passed - Medication indicated for associated diagnosis     Medication is associated with one or more of the following diagnoses:     Genital herpes simplex   Herpes simples   Herpes zoster, shingles   Varicella   Recurrent herpes simplex labialis   HIV infection   Varicella-zoster virus infection, prophylaxis             Last Written Prescription Date:  5/2/2023  Last Fill Quantity: 30,  # refills: 3   Last office visit: 3/2/2023 ; last virtual visit: Visit date not found with prescribing provider:  SHELBY Almeida NP   Future Office Visit:      Pt due for annual, no appt scheduled. Pt already received one month extension. Rx denied.   Clarissa Jason RN on 12/9/2024 at 4:49 PM

## 2025-04-19 ENCOUNTER — HEALTH MAINTENANCE LETTER (OUTPATIENT)
Age: 50
End: 2025-04-19

## 2025-05-10 ENCOUNTER — HEALTH MAINTENANCE LETTER (OUTPATIENT)
Age: 50
End: 2025-05-10